# Patient Record
Sex: FEMALE | Race: WHITE | NOT HISPANIC OR LATINO
[De-identification: names, ages, dates, MRNs, and addresses within clinical notes are randomized per-mention and may not be internally consistent; named-entity substitution may affect disease eponyms.]

---

## 2019-04-07 ENCOUNTER — FORM ENCOUNTER (OUTPATIENT)
Age: 54
End: 2019-04-07

## 2019-04-08 ENCOUNTER — APPOINTMENT (OUTPATIENT)
Dept: ORTHOPEDIC SURGERY | Facility: CLINIC | Age: 54
End: 2019-04-08
Payer: MEDICAID

## 2019-04-08 ENCOUNTER — OUTPATIENT (OUTPATIENT)
Dept: OUTPATIENT SERVICES | Facility: HOSPITAL | Age: 54
LOS: 1 days | End: 2019-04-08
Payer: MEDICAID

## 2019-04-08 ENCOUNTER — APPOINTMENT (OUTPATIENT)
Dept: RADIOLOGY | Facility: CLINIC | Age: 54
End: 2019-04-08

## 2019-04-08 VITALS — HEIGHT: 61 IN | BODY MASS INDEX: 28.32 KG/M2 | WEIGHT: 150 LBS

## 2019-04-08 DIAGNOSIS — Z87.39 PERSONAL HISTORY OF OTHER DISEASES OF THE MUSCULOSKELETAL SYSTEM AND CONNECTIVE TISSUE: ICD-10-CM

## 2019-04-08 DIAGNOSIS — Z82.61 FAMILY HISTORY OF ARTHRITIS: ICD-10-CM

## 2019-04-08 PROCEDURE — 99204 OFFICE O/P NEW MOD 45 MIN: CPT

## 2019-04-08 PROCEDURE — 73564 X-RAY EXAM KNEE 4 OR MORE: CPT | Mod: 26,50

## 2019-04-08 PROCEDURE — 73564 X-RAY EXAM KNEE 4 OR MORE: CPT

## 2019-05-09 ENCOUNTER — APPOINTMENT (OUTPATIENT)
Dept: ORTHOPEDIC SURGERY | Facility: CLINIC | Age: 54
End: 2019-05-09
Payer: MEDICAID

## 2019-05-09 VITALS
WEIGHT: 145 LBS | HEIGHT: 61 IN | BODY MASS INDEX: 27.38 KG/M2 | DIASTOLIC BLOOD PRESSURE: 80 MMHG | SYSTOLIC BLOOD PRESSURE: 120 MMHG

## 2019-05-09 DIAGNOSIS — Z87.39 PERSONAL HISTORY OF OTHER DISEASES OF THE MUSCULOSKELETAL SYSTEM AND CONNECTIVE TISSUE: ICD-10-CM

## 2019-05-09 PROCEDURE — 99214 OFFICE O/P EST MOD 30 MIN: CPT

## 2019-05-09 RX ORDER — MELOXICAM 15 MG/1
15 TABLET ORAL
Refills: 0 | Status: COMPLETED | COMMUNITY
End: 2019-05-09

## 2019-05-09 NOTE — HISTORY OF PRESENT ILLNESS
[de-identified] : 54 year old female with osteoporosis and epilepsy presents today for initial evaluation of right knee pain that began a year ago and has been getting progressively worse. She reports right knee pain with walking and stairs as well as severe stiffness and instability on all surfaces. Patient can walk less than 5 blocks with a moderate limp and can ascend and descend stairs with a rail.  Physical therapy, bracing, ibuprofen and indomethacin help a little to relieve pain. She had a cortisone injection to the right knee which she reports did not help at all. She has consulted with Dr. Corbett who requested insurance authorization for HA injections but patient reports that she has not heard anything from his office.

## 2019-05-09 NOTE — DISCUSSION/SUMMARY
[de-identified] : 55 y/o F with right knee pain and mild arthritis on X-ray. We discussed the diagnosis and prognosis of the patient's condition. Non surgical treatment options include physical therapy and low impact exercise, weight loss for those who are overweight, NSAID and analgesic use, joint injections and activity modification including the use of assistive devices. I advised her to stay lean, limber, strong and coordinated. When the arthritis becomes severe, she may consider TKR surgery. I wrote a prescription for Meloxicam. Continue with physical therapy. My staff will look into whether or not her insurance has been contacted to authorize HA injections. \par Follow up by calling in a few weeks about HA injections if she has heard anything back yet.\par She can follow up with my Dr Corbett or with my PA Louann for further conservative management, as needed.

## 2019-05-09 NOTE — ADDENDUM
[FreeTextEntry1] : This note was written by Altagracia Andrade on 05/09/2019 acting as scribe for Dr. Duarte and KISHAN John.

## 2019-05-09 NOTE — PHYSICAL EXAM
[de-identified] : Constitutional: Well appearing. No acute distress.\par Mental Status: Alert & oriented to person, place and time. Normal affect.\par Pulmonary: No respiratory distress. Normal chest excursion.\par \par Gait: Normal.\par Ambulatory assist devices: Tape over right knee.\par \par Cervical spine: Skin intact. No visible deformity. Painless active ROM without evident restriction.\par Bilateral upper extremities: Skin intact. No deformity. Painless active ROM without evident restriction.\par Thoracolumbar spine: No deformity. No tenderness. No radicular pain on passive straight leg raise bilaterally.\par \par Pelvis: No pelvic obliquity. No tenderness.\par Leg lengths: Equal.\par Bilateral Hips: No swelling or deformity. No focal tenderness. Painless and unrestricted range of motion. No crepitation. \par \par Right Knee:\par Skin intact.\par No surgical scars.\par No erythema or ecchymosis.\par (+) slight effusion.\par No deformity.\par Medial and lateral joint line tenderness.\par Pain on patellar grind.\par Painless ROM from full extension to 135 degrees of flexion.\par Central patellar tracking.\par No crepitation.\par Good stability in all planes.\par \par Left Knee:\par Skin intact.\par No surgical scars.\par No erythema or ecchymosis.\par No swelling or effusion.\par No deformity.\par No focal tenderness.\par Painless and unrestricted range of motion. \par Central patellar tracking.\par No crepitation.\par No instability.\par \par Neurological: Intact distal crude touch sensation. Normal distal motor power. \par Cardiovascular: Palpable dorsalis pedis and posterior tibialis pulses. Brisk capillary refill. No peripheral edema.\par Lymphatics: No peripheral adenopathy appreciated. [de-identified] : X-ray imaging of the bilateral knees from April 2019 demonstrates very mild arthritis bilaterally.

## 2019-06-04 ENCOUNTER — APPOINTMENT (OUTPATIENT)
Dept: ORTHOPEDIC SURGERY | Facility: CLINIC | Age: 54
End: 2019-06-04
Payer: MEDICAID

## 2019-06-04 PROCEDURE — 20610 DRAIN/INJ JOINT/BURSA W/O US: CPT | Mod: RT

## 2019-06-04 RX ADMIN — Medication 1 MG/2ML: at 00:00

## 2019-06-04 NOTE — PROCEDURE
[Injection] : Injection [Bilateral] : of bilateral [Knee Joint] : knee joint [Osteoarthritis] : Osteoarthritis [Joint Pain] : Joint pain [Patient] : patient [Risk] : risk [Benefits] : benefits [Bleeding] : bleeding [Infection] : infection [Alternatives] : alternatives [Allergic Reaction] : allergic reaction [Verbal Consent Obtained] : verbal consent was obtained prior to the procedure [Alcohol] : Alcohol [Betadine] : Betadine [Ethyl Chloride Spray] : ethyl chloride spray was used as a topical anesthetic [Lateral] : lateral [20] : a 20-gauge [1.5  inch] : 1.5 inch needle [2 mL Hyalgan___(lot #)] : 2mL Hyalgan ~Ulot# [unfilled] [Bandage Applied] : a bandage [Tolerated Well] : The patient tolerated the procedure well [None] : none [No Strenuous Activity___day(s)] : avoid strenuous activity for [unfilled] day(s) [___ Week(s)] : in [unfilled] week(s) [de-identified] : Hyalgan # 1 of 3\par Bilateral knees\par Lot.No: [id number]\par Exp.Date: 2021-08-26\par Man: Andrew

## 2019-06-04 NOTE — ADDENDUM
[FreeTextEntry1] : Dr. Duarte was physically present during the key portions the encounter, provided assistance as needed, and formulated the assessment and plan as documented above.\par \par

## 2019-06-11 ENCOUNTER — APPOINTMENT (OUTPATIENT)
Dept: ORTHOPEDIC SURGERY | Facility: CLINIC | Age: 54
End: 2019-06-11
Payer: MEDICAID

## 2019-06-11 PROCEDURE — 20610 DRAIN/INJ JOINT/BURSA W/O US: CPT | Mod: RT

## 2019-06-11 RX ADMIN — Medication 1 MG/2ML: at 00:00

## 2019-06-11 NOTE — PROCEDURE
[de-identified] : Hyalgan # 2 of 3\par Bilateral Knees\par Lot.No: [id number]\par Exp.Date: 2021-08-26\par \par Procedure: Injection of bilateral knee joint. \par Indication:  Osteoarthritis and Joint pain. \par Risk, benefits and alternatives were discussed with the patient. Potential complications include bleeding, infection and allergic reaction. Verbal consent was obtained prior to the procedure. \par Alcohol and Betadine was used to prep the area. ethyl chloride spray was used as a topical anesthetic. Using sterile technique, the aspiration/injection needle was then directed from a lateral aspect. A 20-gauge and 1.5 inch needle was used to inject 2mL Hyalgan lot# [id number]. A bandage was applied. The patient tolerated the procedure well. \par Complications: none. \par Patient instructed to avoid strenuous activity for 2 day(s). \par Follow-up in the office in 1 week(s). \par

## 2019-06-18 ENCOUNTER — APPOINTMENT (OUTPATIENT)
Dept: ORTHOPEDIC SURGERY | Facility: CLINIC | Age: 54
End: 2019-06-18
Payer: MEDICAID

## 2019-06-18 PROCEDURE — 20610 DRAIN/INJ JOINT/BURSA W/O US: CPT | Mod: LT

## 2019-06-18 RX ADMIN — Medication 1 MG/2ML: at 00:00

## 2019-06-18 NOTE — PROCEDURE
[Injection] : Injection [Bilateral] : of bilateral [Knee Joint] : knee joint [Osteoarthritis] : Osteoarthritis [Joint Pain] : Joint pain [Patient] : patient [Risk] : risk [Benefits] : benefits [Alternatives] : alternatives [Bleeding] : bleeding [Infection] : infection [Allergic Reaction] : allergic reaction [Verbal Consent Obtained] : verbal consent was obtained prior to the procedure [Alcohol] : Alcohol [Betadine] : Betadine [Ethyl Chloride Spray] : ethyl chloride spray was used as a topical anesthetic [Lateral] : lateral [20] : a 20-gauge [1.5  inch] : 1.5 inch needle [Tolerated Well] : The patient tolerated the procedure well [2 mL Hyalgan___(lot #)] : 2mL Hyalgan ~Ulot# [unfilled] [None] : none [No Strenuous Activity___day(s)] : avoid strenuous activity for [unfilled] day(s) [PRN] : as needed [de-identified] : Hyalgan # 3 of 3\par Bilateral Knees\par Lot.No: [id number]\par Exp.Date: 2021-08-26\par

## 2019-09-12 ENCOUNTER — APPOINTMENT (OUTPATIENT)
Dept: OTOLARYNGOLOGY | Facility: CLINIC | Age: 54
End: 2019-09-12
Payer: MEDICAID

## 2019-09-12 VITALS
BODY MASS INDEX: 27.38 KG/M2 | HEART RATE: 60 BPM | DIASTOLIC BLOOD PRESSURE: 81 MMHG | HEIGHT: 61 IN | WEIGHT: 145 LBS | SYSTOLIC BLOOD PRESSURE: 135 MMHG

## 2019-09-12 DIAGNOSIS — R09.89 OTHER SPECIFIED SYMPTOMS AND SIGNS INVOLVING THE CIRCULATORY AND RESPIRATORY SYSTEMS: ICD-10-CM

## 2019-09-12 DIAGNOSIS — R49.0 DYSPHONIA: ICD-10-CM

## 2019-09-12 DIAGNOSIS — K21.9 GASTRO-ESOPHAGEAL REFLUX DISEASE W/OUT ESOPHAGITIS: ICD-10-CM

## 2019-09-12 DIAGNOSIS — R13.12 DYSPHAGIA, OROPHARYNGEAL PHASE: ICD-10-CM

## 2019-09-12 PROCEDURE — 31575 DIAGNOSTIC LARYNGOSCOPY: CPT

## 2019-09-12 PROCEDURE — 99203 OFFICE O/P NEW LOW 30 MIN: CPT | Mod: 25

## 2019-09-12 RX ORDER — LEVETIRACETAM 500 MG/1
500 TABLET, FILM COATED, EXTENDED RELEASE ORAL
Qty: 120 | Refills: 0 | Status: ACTIVE | COMMUNITY
Start: 2018-07-09

## 2019-09-12 RX ORDER — ERGOCALCIFEROL 1.25 MG/1
1.25 MG CAPSULE, LIQUID FILLED ORAL
Qty: 4 | Refills: 0 | Status: ACTIVE | COMMUNITY
Start: 2019-05-09

## 2019-09-12 NOTE — CONSULT LETTER
[FreeTextEntry1] : Dear Dr. TEODORO CAR \par I had the pleasure of evaluating your patient YOSSI ELY  thank you for allowing us to participate in their care. please see full note detailing our visit below.\par If you have any questions, please do not hesitate to call me and I would be happy to discuss further. \par \par Ziyad Gutierrez M.D.\par Attending Physician,  \par Department of Otolaryngology - Head and Neck Surgery\par [facility] \par Office: [phone]\par Fax: [phone]\par

## 2019-09-12 NOTE — ASSESSMENT
[FreeTextEntry1] : Pt with globus, difficulty swallowing and throat clearing with severe laryngeal reflux on exam. This is the most probable cause at this point. 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 Handout detailing regiment also given\par - Continue Omeprazole 20mg BID\par - Zantac QHS\par if persists will follow with my partner Dr. Trujillo for possible botox injection\par \par \par I personally saw and examined YOSSI ELY in detail. I spoke to KISHAN Quiñonez regarding the assessment and plan of care.  I preformed the procedures and I reviewed the above assessment and plan of care, and agree. I have made changes in changes in the body of the note where appropriate.\par \par \par

## 2019-09-12 NOTE — PROCEDURE
[de-identified] : Procedure performed: laryngeal Endoscopy- Diagnostic\par Pre-op/post op indication: globus\par Verbal and/or written consent obtained from patient\par Scope #: Olympus flexible fiber optic telescope used.\par Scope was introduced through the nose passed on the floor of the nose to the nasopharynx and then followed down the soft palate to the lower pharynx. The tongue Base, Larynx, Hypopharynx were examined. Base of tongue was symmetric, vallecular was clear, epiglottis was not deformed, Glottis with fully mobile vocal cords without lesions or masses. Visualized subglottis clear. Postcricoid area with erythema and edema.  Pos intra-arytenoid bar. +severe reflux changes. No pooling of secretions, masses or lesions. Airway patent, no foreign body visualized. No glottic/supraglottic edema. True vocal cords, arytenoids, vestibular folds, ventricles, pyriform sinuses, and aryepiglottic folds appear normal bilaterally. Vocal cords mobile with good contact b/l.\par

## 2019-09-12 NOTE — HISTORY OF PRESENT ILLNESS
[FreeTextEntry1] : \par \par \par \par \par \par  [de-identified] : 53 y/o female with around 1 year history of worsening throat tightness and difficulty swallowing solids initially and now with issues swallowing saliva. Had swallow study 3 weeks ago which was abnormal.\par Also worked up by GI for reflux- started on Omeprazole 20mg BID which she's been taking for the past month. \par + Hoarseness x 2-3 weeks\par + SOB- feels gets out of breath when talking\par + Feels neck and throat is swollen\par No throat pain.

## 2019-11-14 ENCOUNTER — APPOINTMENT (OUTPATIENT)
Dept: OTOLARYNGOLOGY | Facility: CLINIC | Age: 54
End: 2019-11-14
Payer: MEDICAID

## 2019-11-14 VITALS
SYSTOLIC BLOOD PRESSURE: 130 MMHG | DIASTOLIC BLOOD PRESSURE: 77 MMHG | BODY MASS INDEX: 27.38 KG/M2 | WEIGHT: 145 LBS | HEIGHT: 61 IN | HEART RATE: 77 BPM

## 2019-11-14 PROCEDURE — 31575 DIAGNOSTIC LARYNGOSCOPY: CPT

## 2019-11-14 PROCEDURE — 99214 OFFICE O/P EST MOD 30 MIN: CPT | Mod: 25

## 2019-11-14 NOTE — CONSULT LETTER
[Dear  ___] : Dear  [unfilled], [Consult Letter:] : I had the pleasure of evaluating your patient, [unfilled]. [Please see my note below.] : Please see my note below. [Consult Closing:] : Thank you very much for allowing me to participate in the care of this patient.  If you have any questions, please do not hesitate to contact me. [Sincerely,] : Sincerely, [FreeTextEntry2] : DR Ziyad Gutierrez [FreeTextEntry3] : \par Tyrese Byrne MD, FACS\par \par Otolaryngology-Head and Neck Surgery\par [employer] [facility]\par

## 2019-11-14 NOTE — HISTORY OF PRESENT ILLNESS
[de-identified] : 53 y/o female refer by Dr. rodriguez for globus sensation. pt has hx of reflux and being workup by GI and told reflux. pt still c.o globus and throat swelling and neck swelling for one year. \par pt denies wt loss, fever, coughing. \par pt has ct of chest and abd done at [facility] 8/2019, no ct neck done

## 2019-11-21 ENCOUNTER — APPOINTMENT (OUTPATIENT)
Dept: OTOLARYNGOLOGY | Facility: CLINIC | Age: 54
End: 2019-11-21

## 2019-12-03 ENCOUNTER — FORM ENCOUNTER (OUTPATIENT)
Age: 54
End: 2019-12-03

## 2019-12-04 ENCOUNTER — OUTPATIENT (OUTPATIENT)
Dept: OUTPATIENT SERVICES | Facility: HOSPITAL | Age: 54
LOS: 1 days | End: 2019-12-04
Payer: MEDICAID

## 2019-12-04 ENCOUNTER — APPOINTMENT (OUTPATIENT)
Dept: RADIOLOGY | Facility: HOSPITAL | Age: 54
End: 2019-12-04

## 2019-12-04 ENCOUNTER — APPOINTMENT (OUTPATIENT)
Dept: SPEECH THERAPY | Facility: HOSPITAL | Age: 54
End: 2019-12-04

## 2019-12-04 ENCOUNTER — OUTPATIENT (OUTPATIENT)
Dept: OUTPATIENT SERVICES | Facility: HOSPITAL | Age: 54
LOS: 1 days | Discharge: HOME | End: 2019-12-04

## 2019-12-04 DIAGNOSIS — K21.9 GASTRO-ESOPHAGEAL REFLUX DISEASE WITHOUT ESOPHAGITIS: ICD-10-CM

## 2019-12-04 DIAGNOSIS — R13.10 DYSPHAGIA, UNSPECIFIED: ICD-10-CM

## 2019-12-04 PROCEDURE — 74230 X-RAY XM SWLNG FUNCJ C+: CPT | Mod: 26

## 2019-12-04 NOTE — HISTORY OF PRESENT ILLNESS
[FreeTextEntry1] : Patient arrived to Radiology for an Outpatient Modified Barium Swallow Study. Patient is a 53 y/o female with PMH as per EMR: \par \par Active Problems\par Dysphagia, oropharyngeal (787.22) (R13.12)\par Dysphonia (784.42) (R49.0)\par Globus pharyngeus (306.4) (R09.89)\par LPRD (laryngopharyngeal reflux disease) (478.79) (K21.9)\par Osteoarthritis of knees, bilateral (715.96) (M17.0)\par Right knee pain (719.46) (M25.561)\par \par Past Medical History\par History of arthritis (V13.4) (Z87.39)\par History of osteoporosis (V13.59) (Z87.39)\par History of Seizures (780.39) (R56.9)\par \par Surgical History\par History of Cholecystectomy\par History of Hand surgery\par History of Knee Surgery\par  \par ENT Note on 11/14/2019 \par - Globus sensation associated with LPR symptoms.\par - Discussed reflux diet and lifestyle changes with the patient today.\par - Written instruction given to the patient.\par - Will review MBS and scans.\par - Return in 4 weeks.\par \par Patient offers c/o difficulty swallowing "feels like it get stuck in my throat" x 2 1/2 years with increasing frequency. Patient reports needing liquid to wash down the food.  Patient reports having difficulty with certain types of food items such as bread, meat, even cake. Patient reports tolerating soft consistency (e.g. salmon, mash potato) with Thin Liquids. Patient states that she has been more attentive to avoid food  items that may increase her reflux symptoms (Reflux Diet). Patient also reports that her "right sided swelling has gone down".  No current/repeated pneumonia and no unintentional weight loss.  Patient reports that she had an Esophagram completed at an outside facility back on August 2019 was told "it narrows down."   This Modified Barium Swallow Study is to objectively assess the Physiology of the Oral and Pharyngeal stage swallowing mechanism for treatment plan for least restrictive diet. \par \par Referring MD: Dr. Tyrese Byrne\par

## 2019-12-04 NOTE — ASSESSMENT
[FreeTextEntry1] : Patient presents with Functional Oral and Pharyngeal stage swallowing mechanism.  The Oral Stage is characterized by adequate oral containment, adequate chewing for solid, adequate bolus manipulation, adequate tongue motion with adequate anterior to posterior transfer of the bolus; intermittent piecemeal transfer and deglutition with trace oral clearance deficits located on the tongue surface post primary swallow; with adequate oral clearance post spontaneous secondary swallow.    The Pharyngeal Stage is characterized by adequate initiation of the pharyngeal swallow, adequate laryngeal elevation, adequate tongue base retraction and adequate pharyngeal constriction. There was intermittent trace vallecular residue post primary swallow for puree/solids.  There was No Aspiration observed before, during or after the swallow for puree/solids/thin liquids.\par \par Of Note: An Esophageal Screen was performed. Patient was given a Barium Tablet with a cup of water. The Tablet was observed to course through the pharynx/esophagus without hold up. This cannot be considered as a full complete evaluation of the esophagus. \par \par

## 2019-12-04 NOTE — PLAN
[FreeTextEntry2] : \par 1.) Regular Consistency with Thin liquids\par 2.) Feeding/Swallowing Guidelines: Sit upright, small bites, chew solids well, single cup sips; two swallows per bite/sip.\par 3.) Aspiration Precautions\par 4.) Reflux Precautions and Management\par 5.) Maintain Good Oral Hygiene Care\par 6.) Follow up with referring Physician\par 7.) Follow up with GI Physician given patient reports having sensation of food passing slowly down the esophagus pointing to the mid-chest area post swallow. \par \par SLP provided Patient education regarding preliminary results. Patient verbalized understanding and will follow up with Physicians for management. \par

## 2019-12-06 DIAGNOSIS — R13.10 DYSPHAGIA, UNSPECIFIED: ICD-10-CM

## 2019-12-19 ENCOUNTER — APPOINTMENT (OUTPATIENT)
Dept: OTOLARYNGOLOGY | Facility: CLINIC | Age: 54
End: 2019-12-19
Payer: MEDICAID

## 2019-12-19 VITALS
DIASTOLIC BLOOD PRESSURE: 87 MMHG | HEART RATE: 71 BPM | SYSTOLIC BLOOD PRESSURE: 133 MMHG | HEIGHT: 61 IN | WEIGHT: 145 LBS | BODY MASS INDEX: 27.38 KG/M2

## 2019-12-19 PROCEDURE — 99213 OFFICE O/P EST LOW 20 MIN: CPT

## 2019-12-19 RX ORDER — RANITIDINE 150 MG/1
150 TABLET ORAL
Qty: 90 | Refills: 3 | Status: COMPLETED | COMMUNITY
Start: 2019-09-12 | End: 2019-12-19

## 2019-12-19 RX ORDER — ALENDRONATE SODIUM 70 MG/1
70 TABLET ORAL
Qty: 4 | Refills: 0 | Status: COMPLETED | COMMUNITY
Start: 2018-12-04 | End: 2019-12-19

## 2019-12-19 RX ORDER — MUPIROCIN 20 MG/G
2 OINTMENT TOPICAL
Qty: 22 | Refills: 0 | Status: COMPLETED | COMMUNITY
Start: 2019-08-12 | End: 2019-12-19

## 2019-12-19 RX ORDER — MELOXICAM 15 MG/1
15 TABLET ORAL
Qty: 30 | Refills: 1 | Status: COMPLETED | COMMUNITY
Start: 2019-05-09 | End: 2019-12-19

## 2019-12-19 RX ORDER — OMEPRAZOLE 20 MG/1
20 CAPSULE, DELAYED RELEASE ORAL
Qty: 60 | Refills: 0 | Status: COMPLETED | COMMUNITY
Start: 2019-08-21 | End: 2019-12-19

## 2019-12-19 RX ORDER — INDOMETHACIN 50 MG/1
CAPSULE ORAL
Refills: 0 | Status: COMPLETED | COMMUNITY
End: 2019-12-19

## 2019-12-20 NOTE — HISTORY OF PRESENT ILLNESS
[de-identified] : 55 y/o female refer by Dr. Gutierrez for globus sensation. pt has hx of reflux and being workup by GI and told reflux. pt still c.o globus and throat swelling and neck swelling for one year. \par pt denies wt loss, fever, coughing. \par pt has ct of chest and abd done at [facility] 8/2019, no ct neck done \par symptoms much better after she started reflux diet.\par Complete review of systems which was performed during a previous encounter was reviewed with the patient and there are no changes except as stated in the HPI section.\par

## 2020-10-08 ENCOUNTER — APPOINTMENT (OUTPATIENT)
Dept: ORTHOPEDIC SURGERY | Facility: CLINIC | Age: 55
End: 2020-10-08
Payer: MEDICAID

## 2020-10-08 VITALS
WEIGHT: 147 LBS | BODY MASS INDEX: 27.75 KG/M2 | DIASTOLIC BLOOD PRESSURE: 84 MMHG | HEART RATE: 74 BPM | SYSTOLIC BLOOD PRESSURE: 148 MMHG | TEMPERATURE: 98.1 F | HEIGHT: 61 IN

## 2020-10-08 DIAGNOSIS — Z78.9 OTHER SPECIFIED HEALTH STATUS: ICD-10-CM

## 2020-10-08 PROCEDURE — 72170 X-RAY EXAM OF PELVIS: CPT

## 2020-10-08 PROCEDURE — 73562 X-RAY EXAM OF KNEE 3: CPT | Mod: RT

## 2020-10-08 PROCEDURE — 99215 OFFICE O/P EST HI 40 MIN: CPT

## 2020-10-08 NOTE — HISTORY OF PRESENT ILLNESS
[de-identified] : Ms. YOSSI ELY is a 55 year old female  presenting with right knee pain, worsening.  She localizes the pain to the medial anterior aspect of the right knee. She  describes the pain as constant, and states the pain is present when standing, sitting, walking and laying. She admits to occasional swelling, clicking and grinding of the knee. She has occasional buckling as well. \par She has been treated conservatively with physical therapy, Steroid injection in June of 2020, with no relief. she admits to limitations of quality of life, and is here to discuss options for treatment.\par  [Worsening] : worsening [9] : a current pain level of 9/10 [Constant] : ~He/She~ states the symptoms seem to be constant [Walking] : worsened by walking [Rest] : relieved by rest

## 2020-10-08 NOTE — PHYSICAL EXAM
[LE] : Sensory: Intact in bilateral lower extremities [Knee] : patellar 2+ and symmetric bilaterally [Ankle] : ankle 2+ and symmetric bilaterally [DP] : dorsalis pedis 2+ and symmetric bilaterally [PT] : posterior tibial 2+ and symmetric bilaterally [de-identified] : On general examination the patient is adequately groomed and nourished. The vital parameters are as recorded. \par There is no lymphedema or diffuse swelling, no varicose veins, no skin warmth/erythema/scars/swelling, no ulcers and no palpable lymph nodes or masses in both lower extremities. Bilateral pedal pulses are well palpable.\par Upper Extremity:\par Both right and left upper extremities are unremarkable in terms of skin rash, lesions, pigmentation, redness, tenderness, swelling, joint instability, abnormal deformity or crepitus. The overall range of motion, sensation, motor tone and strength testing are normal.\par \par Bilateral Knee Exam: \par The gait is bilateral stiff knee antalgic.\par Knee alignment:          bilateral varus, right more than left. \par Both knees demonstrate no scars and the skin has no warmth, erythema, swelling or tenderness. \par Both knees have a range of motion of\par Extension:                    Right 0 degrees     Left 0 degrees\par Flexion:                                   Right 110 degrees      Left 125 degrees\par There is right knee medial joint line tenderness. There is right knee mild effusion. \par Fouzia's test is positive. Erica test is positive.\par Lachman's test, Anterior/Posterior Drawer test and Pivot Shift Tests are negative. \par There is right knee grade 1 MCL mediolateral laxity and no anteroposterior instability. \par Patella compression test is positive and patellofemoral tracking is normal with no lateral subluxation, apprehension or instability. \par Right knee quadriceps and hamstrings power is 4+.\par Left knee quadriceps and hamstrings power is 4+.\par \par Hip Exam:\par The gait and station is normal\par The patient has equal leg lengths and no pelvic tilt. Siddhartha/Nieves test is 7 inches on the right and 7 inches on the left. Active SLR is 60 degrees on the right and 60 degrees on the left. Both hips demonstrate no scars and the skin has no signs of inflammation or tenderness. \par Both Hips have a normal range of motion of flexion to 100 degrees, abduction 40 degrees, adduction 20 degrees, external rotation 40 degrees, internal rotation 20 degrees with symmetrical motion in flexion and extension. There is no flexion contracture, deformity or instability. Labral impingement tests are negative.\par Both hips flexor, abductor and extensor power is normal.\par  [de-identified] : The following radiographs were ordered and read by me during this patients visit. I reviewed each radiograph in detail with the patient and discussed the findings as highlighted below. \par AP, lateral and skyline views of the right knee confirm advanced degenerative joint disease with medial joint space narrowing and osteophyte formation, along with subchondral sclerosis. \par AP view of the pelvis are within normal limits\par

## 2020-10-08 NOTE — CONSULT LETTER
[Dear  ___] : Dear  [unfilled], [Consult Letter:] : I had the pleasure of evaluating your patient, [unfilled]. [Please see my note below.] : Please see my note below. [Consult Closing:] : Thank you very much for allowing me to participate in the care of this patient.  If you have any questions, please do not hesitate to contact me. [Sincerely,] : Sincerely, [DrFranklin  ___] : Dr. ARGUELLO [FreeTextEntry2] : Dr. Vicenta Oneal\par TEODORO CAR\par  [FreeTextEntry3] : Que Gonzalez MD\par \par ______________________________________________\par [facility]: Hip/Knee Arthroplasty\par [address], [address], [place] [address]\par (t) [phone]\par (f) [phone]\par

## 2020-10-08 NOTE — DISCUSSION/SUMMARY
[de-identified] : Right knee advanced degenerative joint disease \par \par The natural history and treatment of degenerative arthritis was discussed with the patient at length today. The spectrum of treatment including nonoperative modalities to surgical intervention was elucidated. Noninvasive and nonoperative treatment modalities include weight reduction, activity modification with low impact exercise,  as needed use of acetaminophen or anti-inflammatory medications if tolerated, glucosamine/chondroitin supplements, and physical therapy. Further treatments can include corticosteroid injection and the use of viscosupplementation with hyaluronic acid injections. Definitive surgical treatment can certainly include total joint arthroplasty also. The risks and benefits of each treatment options was discussed and all questions were answered.\par \par In view of lack of adequate pain relief with conservative (non-surgical) management protocol including physical therapy, home exercises, weight loss, activity modification, NSAIDS; the patient is recommended to consider a RIGHT Total Knee Replacement. \par \par The risks, benefits, alternatives, implications, complications including but not limited to pain, stiffness, bleeding, limp, wound breakdown, infection, bone fracture, nerve and vascular compromise, implant wear, fixation options depending on bone quality, instability, and durability issues and rehabilitation were discussed and relevant questions were addressed. The possibility of recurrent pain, no improvement in pain and actual worsening of the pain were also mentioned in conversation with the patient. Medical complications related to the patient's general medical health including deep vein thrombosis, pulmonary embolus, heart attack, stroke, death and other complications from anesthesia were discussed as well.  Anticoagulation prophylaxis medication options to address risks of deep vein thrombosis and pulmonary embolism were discussed and weighed against the risks of bleeding and wound healing complications. The patient elected Ecotrin/Xarelto prophylaxis with mechanical modalities.\par \par  I have reviewed the plan of care as well as a model of a total knee replacement implant equivalent to the one that will be used for their total knee replacement.  The patient agrees with the plan of care, as well as the use of implants for their total knee replacement.  The patient wishes to proceed and will undergo preoperative medical evaluation and clearance, attend the preoperative educational class and will schedule surgery appropriately.\par \par Patient has been indicated for a KEVIN Robotic Assisted total knee replacement. A prescription for a CT scan with KEVIN Protocol was provided for the right knee.\par \par The patient was advised that a COVID PCR test would be required within 72 hours prior to surgery.

## 2020-11-24 ENCOUNTER — APPOINTMENT (OUTPATIENT)
Dept: ORTHOPEDIC SURGERY | Facility: CLINIC | Age: 55
End: 2020-11-24
Payer: MEDICAID

## 2020-11-24 VITALS — TEMPERATURE: 96.6 F

## 2020-11-24 DIAGNOSIS — Z78.9 OTHER SPECIFIED HEALTH STATUS: ICD-10-CM

## 2020-11-24 DIAGNOSIS — Z72.3 LACK OF PHYSICAL EXERCISE: ICD-10-CM

## 2020-11-24 DIAGNOSIS — Z82.61 FAMILY HISTORY OF ARTHRITIS: ICD-10-CM

## 2020-11-24 DIAGNOSIS — Z82.62 FAMILY HISTORY OF OSTEOPOROSIS: ICD-10-CM

## 2020-11-24 PROCEDURE — 99214 OFFICE O/P EST MOD 30 MIN: CPT

## 2020-11-24 NOTE — DISCUSSION/SUMMARY
[de-identified] : This patient has severe right knee osteoarthritis.  She has failed a course of conservative management and would like to proceed with a right total knee arthroplasty using computer navigation for assistance.\par \par The patient is an appropriate candidate for consideration of right total knee replacement. An extensive discussion was conducted of the natural history of the disease and the variety of surgical and non-surgical treatment options available to the patient. A risk/benefit analysis was discussed with the patient reviewing the advantages and disadvantages of surgical intervention at this time. Both the level and length of the patient's pain have made additional conservative treatment measures consisting of NSAIDs, physical therapy, corticosteroids, and/or viscosupplementation contraindicated. A full explanation was given of the nature and the purpose of the procedure and anesthesia, its benefits, possible alternative methods of diagnosis or treatment, the risks involved, the possibility of complications, the foreseeable consequences of the procedure and the possible results of the non-treatment. I reviewed the plan of care as well as used a model of a total knee implant equivalent to the one that will be used for their total knee joint replacement. The ability to secure the implant utilizing cement or cementless (press-fit) was discussed with the patient. The patient agrees with the plan of care, as well as the use of implants for their total knee replacement.   We also discussed that if robotic/computer navigation is utilized, then additional incisions may need to be made to accommodate the computer navigation arrays, which will be placed in the femur and tibia.\par \par No guarantee or assurance was made as to the results that may be obtained. Specifically, the risks were identified to include, but are not limited to the following: Infection, phlebitis, pulmonary embolism, death, paralysis, dislocation, pain, stiffness, instability, limp, weakness, breakage, leg-length inequality, uncontrolled bleeding, nerve injury, blood vessel injury, pressure sores, anesthetic risks, delayed healing of wound and bone, and wear and loosening. Further discussion was undertaken with the patient about the details of surgical preparation, treatment, and postoperative rehabilitation including medical clearance, autotransfusion, the hospital course, and the postoperative rehabilitation involved. All in all, I feel that this patient is a good candidate for surgical reconstruction.\par \par The patient and I discussed the current SARS-CoV-2 (COVID-19) pandemic which has affected our local hospitals. We discussed that our hospitals treat patients with COVID-19. All efforts will be made to avoid cohorting the patient with diagnosed or suspected COVID-19 patient. They also understand that we will screen them 24-48 hours prior to surgery. Despite our best efforts, there is a potential risk for iatrogenic transmission of COVID-19 to the patient during the perioperative period. [place] COVID-19 during the perioperative period may increase the patient´s risks of an adverse outcome including postoperative pneumonia, difficulty breathing, requirement for a breathing tube (general endotracheal intubation), and death. The patient is understanding of this risk, and is willing to proceed with surgery at this time.

## 2020-11-24 NOTE — HISTORY OF PRESENT ILLNESS
[de-identified] : This is very nice 55-year-old female experiencing right knee pain, which is severe in intensity.  Pain is been present for more than 6 months.  The pain substantially limits activities of daily living. Walking tolerance is reduced. Medication including NSAIDs and activity modification have been minimally effective for a period lasting greater than three months in duration.  The patient is already tried a course of intra-articular cortisone injections which have not helped.  Assistive devices and external support were not deemed by the patient to be helpful in improving their function. Due to the severity of osteoarthritis and level of pain, physical therapy is contraindicated. Pain and restriction of function are intolerable at this time. The patient denies any radiation of the pain to the feet and it is not associated with numbness, tingling, or weakness.

## 2020-11-24 NOTE — PHYSICAL EXAM
[de-identified] : Patient is well nourished, well-developed, in no acute distress, with appropriate mood and affect. The patient is oriented to time, place, and person. Respirations are even and unlabored. Gait evaluation does reveal a limp. There is no inguinal adenopathy. Examination of the contralateral knee shows normal range of motion, strength, no tenderness, and intact skin. The affected limb is well-perfused, without skin lesions, shows a grossly normal motor and sensory examination. Knee motion is significantly reduced and does cause significant pain. The knee moves from 0 to 125 degrees. The knee is stable within that range-of-motion to AP and ML stress. The alignment of the knee is 5 degrees varus. Muscle strength is normal. Pedal pulses are palpable. Hip examination was negative.\par  [de-identified] : Long standing knee, AP knee, lateral knee, and patellar views of the right knee were brought in by the patient which I reviewed and demonstrate degenerative joint disease of the knee with joint space narrowing, osteophyte formation, and subchondral sclerosis.

## 2020-12-14 ENCOUNTER — OUTPATIENT (OUTPATIENT)
Dept: OUTPATIENT SERVICES | Facility: HOSPITAL | Age: 55
LOS: 1 days | End: 2020-12-14
Payer: MEDICAID

## 2020-12-14 VITALS
HEART RATE: 68 BPM | HEIGHT: 61 IN | WEIGHT: 149.91 LBS | SYSTOLIC BLOOD PRESSURE: 140 MMHG | DIASTOLIC BLOOD PRESSURE: 80 MMHG | RESPIRATION RATE: 16 BRPM | OXYGEN SATURATION: 98 % | TEMPERATURE: 98 F

## 2020-12-14 DIAGNOSIS — M19.90 UNSPECIFIED OSTEOARTHRITIS, UNSPECIFIED SITE: ICD-10-CM

## 2020-12-14 DIAGNOSIS — Z98.891 HISTORY OF UTERINE SCAR FROM PREVIOUS SURGERY: Chronic | ICD-10-CM

## 2020-12-14 DIAGNOSIS — M17.11 UNILATERAL PRIMARY OSTEOARTHRITIS, RIGHT KNEE: ICD-10-CM

## 2020-12-14 DIAGNOSIS — Z90.49 ACQUIRED ABSENCE OF OTHER SPECIFIED PARTS OF DIGESTIVE TRACT: Chronic | ICD-10-CM

## 2020-12-14 DIAGNOSIS — Z01.818 ENCOUNTER FOR OTHER PREPROCEDURAL EXAMINATION: ICD-10-CM

## 2020-12-14 DIAGNOSIS — Z98.890 OTHER SPECIFIED POSTPROCEDURAL STATES: Chronic | ICD-10-CM

## 2020-12-14 LAB
A1C WITH ESTIMATED AVERAGE GLUCOSE RESULT: 5.4 % — SIGNIFICANT CHANGE UP (ref 4–5.6)
ANION GAP SERPL CALC-SCNC: 11 MMOL/L — SIGNIFICANT CHANGE UP (ref 5–17)
BLD GP AB SCN SERPL QL: NEGATIVE — SIGNIFICANT CHANGE UP
BUN SERPL-MCNC: 9 MG/DL — SIGNIFICANT CHANGE UP (ref 7–23)
CALCIUM SERPL-MCNC: 9.6 MG/DL — SIGNIFICANT CHANGE UP (ref 8.4–10.5)
CHLORIDE SERPL-SCNC: 106 MMOL/L — SIGNIFICANT CHANGE UP (ref 96–108)
CO2 SERPL-SCNC: 25 MMOL/L — SIGNIFICANT CHANGE UP (ref 22–31)
CREAT SERPL-MCNC: 0.57 MG/DL — SIGNIFICANT CHANGE UP (ref 0.5–1.3)
ESTIMATED AVERAGE GLUCOSE: 108 MG/DL — SIGNIFICANT CHANGE UP (ref 68–114)
GLUCOSE SERPL-MCNC: 80 MG/DL — SIGNIFICANT CHANGE UP (ref 70–99)
HCT VFR BLD CALC: 42.9 % — SIGNIFICANT CHANGE UP (ref 34.5–45)
HGB BLD-MCNC: 14.1 G/DL — SIGNIFICANT CHANGE UP (ref 11.5–15.5)
MCHC RBC-ENTMCNC: 30.5 PG — SIGNIFICANT CHANGE UP (ref 27–34)
MCHC RBC-ENTMCNC: 32.9 GM/DL — SIGNIFICANT CHANGE UP (ref 32–36)
MCV RBC AUTO: 92.7 FL — SIGNIFICANT CHANGE UP (ref 80–100)
NRBC # BLD: 0 /100 WBCS — SIGNIFICANT CHANGE UP (ref 0–0)
PLATELET # BLD AUTO: 186 K/UL — SIGNIFICANT CHANGE UP (ref 150–400)
POTASSIUM SERPL-MCNC: 4.3 MMOL/L — SIGNIFICANT CHANGE UP (ref 3.5–5.3)
POTASSIUM SERPL-SCNC: 4.3 MMOL/L — SIGNIFICANT CHANGE UP (ref 3.5–5.3)
RBC # BLD: 4.63 M/UL — SIGNIFICANT CHANGE UP (ref 3.8–5.2)
RBC # FLD: 12.1 % — SIGNIFICANT CHANGE UP (ref 10.3–14.5)
RH IG SCN BLD-IMP: POSITIVE — SIGNIFICANT CHANGE UP
SODIUM SERPL-SCNC: 142 MMOL/L — SIGNIFICANT CHANGE UP (ref 135–145)
WBC # BLD: 7.28 K/UL — SIGNIFICANT CHANGE UP (ref 3.8–10.5)
WBC # FLD AUTO: 7.28 K/UL — SIGNIFICANT CHANGE UP (ref 3.8–10.5)

## 2020-12-14 PROCEDURE — 86901 BLOOD TYPING SEROLOGIC RH(D): CPT

## 2020-12-14 PROCEDURE — G0463: CPT

## 2020-12-14 PROCEDURE — 83036 HEMOGLOBIN GLYCOSYLATED A1C: CPT

## 2020-12-14 PROCEDURE — 87640 STAPH A DNA AMP PROBE: CPT

## 2020-12-14 PROCEDURE — 85027 COMPLETE CBC AUTOMATED: CPT

## 2020-12-14 PROCEDURE — 86900 BLOOD TYPING SEROLOGIC ABO: CPT

## 2020-12-14 PROCEDURE — 86850 RBC ANTIBODY SCREEN: CPT

## 2020-12-14 PROCEDURE — 87641 MR-STAPH DNA AMP PROBE: CPT

## 2020-12-14 PROCEDURE — 80048 BASIC METABOLIC PNL TOTAL CA: CPT

## 2020-12-14 RX ORDER — LIDOCAINE HCL 20 MG/ML
0.2 VIAL (ML) INJECTION ONCE
Refills: 0 | Status: DISCONTINUED | OUTPATIENT
Start: 2020-12-23 | End: 2021-01-06

## 2020-12-14 RX ORDER — LEVETIRACETAM 250 MG/1
1 TABLET, FILM COATED ORAL
Qty: 0 | Refills: 0 | DISCHARGE

## 2020-12-14 RX ORDER — GENTAMICIN SULFATE 40 MG/ML
80 VIAL (ML) INJECTION ONCE
Refills: 0 | Status: DISCONTINUED | OUTPATIENT
Start: 2020-12-23 | End: 2021-01-06

## 2020-12-14 RX ORDER — SODIUM CHLORIDE 9 MG/ML
3 INJECTION INTRAMUSCULAR; INTRAVENOUS; SUBCUTANEOUS EVERY 8 HOURS
Refills: 0 | Status: DISCONTINUED | OUTPATIENT
Start: 2020-12-23 | End: 2021-01-06

## 2020-12-14 NOTE — H&P PST ADULT - HISTORY OF PRESENT ILLNESS
56 y/o female with h/o seizure (last episode 2016) OA, c/o right knee pain he pain substantially limits activities of daily living. Walking tolerance is reduced, s/p cortisone injections (last 8/2020) and PT which have not helped. Today she present to PST for scheduled Right Total Knee Replacement on 12/23/20. Denies any palpitations, SOB, N/V, fever or chills.   ***COVID swab booking info provided pt will schedule visit 72 hr prior to surgery. Pt verbalized understanding.***

## 2020-12-14 NOTE — H&P PST ADULT - NSICDXPASTSURGICALHX_GEN_ALL_CORE_FT
PAST SURGICAL HISTORY:  H/O arthroscopy of knee Right      H/O arthroscopy of shoulder 2010 Right    H/O hand surgery 2017 Left hand    H/O:      S/P cholecystectomy > 15 years ago

## 2020-12-14 NOTE — H&P PST ADULT - NSICDXPROBLEM_GEN_ALL_CORE_FT
PROBLEM DIAGNOSES  Problem: OA (osteoarthritis)  Assessment and Plan: Right Total Knee Replacement on 12/23/20  Pre-op education provided - all questions answered. Pt verbalized understanding

## 2020-12-14 NOTE — H&P PST ADULT - NSICDXPASTMEDICALHX_GEN_ALL_CORE_FT
PAST MEDICAL HISTORY:  Globus pharyngeus h/o    H/O placenta previa     OA (osteoarthritis)     OP (osteoporosis)     Seizure last episode 2016 (missed dose)
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
## 2020-12-15 LAB
MRSA PCR RESULT.: SIGNIFICANT CHANGE UP
S AUREUS DNA NOSE QL NAA+PROBE: SIGNIFICANT CHANGE UP

## 2020-12-16 PROBLEM — R09.89 OTHER SPECIFIED SYMPTOMS AND SIGNS INVOLVING THE CIRCULATORY AND RESPIRATORY SYSTEMS: Chronic | Status: ACTIVE | Noted: 2020-12-14

## 2020-12-16 PROBLEM — M81.0 AGE-RELATED OSTEOPOROSIS WITHOUT CURRENT PATHOLOGICAL FRACTURE: Chronic | Status: ACTIVE | Noted: 2020-12-14

## 2020-12-16 PROBLEM — Z87.59 PERSONAL HISTORY OF OTHER COMPLICATIONS OF PREGNANCY, CHILDBIRTH AND THE PUERPERIUM: Chronic | Status: ACTIVE | Noted: 2020-12-14

## 2020-12-16 PROBLEM — M19.90 UNSPECIFIED OSTEOARTHRITIS, UNSPECIFIED SITE: Chronic | Status: ACTIVE | Noted: 2020-12-14

## 2020-12-20 ENCOUNTER — OUTPATIENT (OUTPATIENT)
Dept: OUTPATIENT SERVICES | Facility: HOSPITAL | Age: 55
LOS: 1 days | End: 2020-12-20
Payer: MEDICAID

## 2020-12-20 DIAGNOSIS — Z20.828 CONTACT WITH AND (SUSPECTED) EXPOSURE TO OTHER VIRAL COMMUNICABLE DISEASES: ICD-10-CM

## 2020-12-20 DIAGNOSIS — Z98.890 OTHER SPECIFIED POSTPROCEDURAL STATES: Chronic | ICD-10-CM

## 2020-12-20 DIAGNOSIS — Z98.891 HISTORY OF UTERINE SCAR FROM PREVIOUS SURGERY: Chronic | ICD-10-CM

## 2020-12-20 DIAGNOSIS — Z90.49 ACQUIRED ABSENCE OF OTHER SPECIFIED PARTS OF DIGESTIVE TRACT: Chronic | ICD-10-CM

## 2020-12-20 LAB — SARS-COV-2 RNA SPEC QL NAA+PROBE: SIGNIFICANT CHANGE UP

## 2020-12-20 PROCEDURE — U0003: CPT

## 2020-12-22 ENCOUNTER — TRANSCRIPTION ENCOUNTER (OUTPATIENT)
Age: 55
End: 2020-12-22

## 2020-12-22 RX ORDER — PANTOPRAZOLE 40 MG/1
40 TABLET, DELAYED RELEASE ORAL DAILY
Qty: 30 | Refills: 0 | Status: ACTIVE | COMMUNITY
Start: 2020-12-22 | End: 1900-01-01

## 2020-12-22 RX ORDER — ASPIRIN 81 MG/1
81 TABLET ORAL TWICE DAILY
Qty: 60 | Refills: 0 | Status: ACTIVE | COMMUNITY
Start: 2020-12-22 | End: 1900-01-01

## 2020-12-22 RX ORDER — PREGABALIN 75 MG/1
75 CAPSULE ORAL TWICE DAILY
Qty: 28 | Refills: 0 | Status: ACTIVE | COMMUNITY
Start: 2020-12-22 | End: 1900-01-01

## 2020-12-22 RX ORDER — OXYCODONE 5 MG/1
5 TABLET ORAL
Qty: 40 | Refills: 0 | Status: ACTIVE | COMMUNITY
Start: 2020-12-22 | End: 1900-01-01

## 2020-12-22 RX ORDER — NAPROXEN 500 MG/1
500 TABLET ORAL
Qty: 30 | Refills: 0 | Status: ACTIVE | COMMUNITY
Start: 2020-12-22 | End: 1900-01-01

## 2020-12-22 RX ORDER — TRAMADOL HYDROCHLORIDE 50 MG/1
50 TABLET, COATED ORAL
Qty: 40 | Refills: 0 | Status: ACTIVE | COMMUNITY
Start: 2020-12-22 | End: 1900-01-01

## 2020-12-23 ENCOUNTER — RESULT REVIEW (OUTPATIENT)
Age: 55
End: 2020-12-23

## 2020-12-23 ENCOUNTER — APPOINTMENT (OUTPATIENT)
Dept: ORTHOPEDIC SURGERY | Facility: HOSPITAL | Age: 55
End: 2020-12-23

## 2020-12-23 ENCOUNTER — OUTPATIENT (OUTPATIENT)
Dept: OUTPATIENT SERVICES | Facility: HOSPITAL | Age: 55
LOS: 1 days | End: 2020-12-23
Payer: MEDICAID

## 2020-12-23 VITALS
WEIGHT: 149.91 LBS | HEART RATE: 71 BPM | DIASTOLIC BLOOD PRESSURE: 77 MMHG | TEMPERATURE: 97 F | RESPIRATION RATE: 18 BRPM | OXYGEN SATURATION: 99 % | SYSTOLIC BLOOD PRESSURE: 135 MMHG | HEIGHT: 61 IN

## 2020-12-23 DIAGNOSIS — Z98.890 OTHER SPECIFIED POSTPROCEDURAL STATES: Chronic | ICD-10-CM

## 2020-12-23 DIAGNOSIS — Z90.49 ACQUIRED ABSENCE OF OTHER SPECIFIED PARTS OF DIGESTIVE TRACT: Chronic | ICD-10-CM

## 2020-12-23 DIAGNOSIS — Z01.818 ENCOUNTER FOR OTHER PREPROCEDURAL EXAMINATION: ICD-10-CM

## 2020-12-23 DIAGNOSIS — M17.11 UNILATERAL PRIMARY OSTEOARTHRITIS, RIGHT KNEE: ICD-10-CM

## 2020-12-23 DIAGNOSIS — Z98.891 HISTORY OF UTERINE SCAR FROM PREVIOUS SURGERY: Chronic | ICD-10-CM

## 2020-12-23 LAB
ANION GAP SERPL CALC-SCNC: 14 MMOL/L — SIGNIFICANT CHANGE UP (ref 5–17)
BUN SERPL-MCNC: 12 MG/DL — SIGNIFICANT CHANGE UP (ref 7–23)
CALCIUM SERPL-MCNC: 8.5 MG/DL — SIGNIFICANT CHANGE UP (ref 8.4–10.5)
CHLORIDE SERPL-SCNC: 102 MMOL/L — SIGNIFICANT CHANGE UP (ref 96–108)
CO2 SERPL-SCNC: 23 MMOL/L — SIGNIFICANT CHANGE UP (ref 22–31)
CREAT SERPL-MCNC: 0.61 MG/DL — SIGNIFICANT CHANGE UP (ref 0.5–1.3)
GLUCOSE SERPL-MCNC: 167 MG/DL — HIGH (ref 70–99)
HCT VFR BLD CALC: 41.4 % — SIGNIFICANT CHANGE UP (ref 34.5–45)
HGB BLD-MCNC: 13.6 G/DL — SIGNIFICANT CHANGE UP (ref 11.5–15.5)
MCHC RBC-ENTMCNC: 30.6 PG — SIGNIFICANT CHANGE UP (ref 27–34)
MCHC RBC-ENTMCNC: 32.9 GM/DL — SIGNIFICANT CHANGE UP (ref 32–36)
MCV RBC AUTO: 93.2 FL — SIGNIFICANT CHANGE UP (ref 80–100)
NRBC # BLD: 0 /100 WBCS — SIGNIFICANT CHANGE UP (ref 0–0)
PLATELET # BLD AUTO: 179 K/UL — SIGNIFICANT CHANGE UP (ref 150–400)
POTASSIUM SERPL-MCNC: 4.2 MMOL/L — SIGNIFICANT CHANGE UP (ref 3.5–5.3)
POTASSIUM SERPL-SCNC: 4.2 MMOL/L — SIGNIFICANT CHANGE UP (ref 3.5–5.3)
RBC # BLD: 4.44 M/UL — SIGNIFICANT CHANGE UP (ref 3.8–5.2)
RBC # FLD: 12.1 % — SIGNIFICANT CHANGE UP (ref 10.3–14.5)
SODIUM SERPL-SCNC: 139 MMOL/L — SIGNIFICANT CHANGE UP (ref 135–145)
WBC # BLD: 7.37 K/UL — SIGNIFICANT CHANGE UP (ref 3.8–10.5)
WBC # FLD AUTO: 7.37 K/UL — SIGNIFICANT CHANGE UP (ref 3.8–10.5)

## 2020-12-23 PROCEDURE — 73560 X-RAY EXAM OF KNEE 1 OR 2: CPT | Mod: 26,RT

## 2020-12-23 PROCEDURE — 20985 CPTR-ASST DIR MS PX: CPT

## 2020-12-23 PROCEDURE — 27447 TOTAL KNEE ARTHROPLASTY: CPT | Mod: RT

## 2020-12-23 RX ORDER — ACETAMINOPHEN 500 MG
1000 TABLET ORAL ONCE
Refills: 0 | Status: DISCONTINUED | OUTPATIENT
Start: 2020-12-24 | End: 2021-01-06

## 2020-12-23 RX ORDER — HYDROMORPHONE HYDROCHLORIDE 2 MG/ML
0.5 INJECTION INTRAMUSCULAR; INTRAVENOUS; SUBCUTANEOUS ONCE
Refills: 0 | Status: DISCONTINUED | OUTPATIENT
Start: 2020-12-23 | End: 2020-12-23

## 2020-12-23 RX ORDER — PANTOPRAZOLE SODIUM 20 MG/1
40 TABLET, DELAYED RELEASE ORAL
Refills: 0 | Status: DISCONTINUED | OUTPATIENT
Start: 2020-12-23 | End: 2021-01-06

## 2020-12-23 RX ORDER — OXYCODONE HYDROCHLORIDE 5 MG/1
10 TABLET ORAL EVERY 4 HOURS
Refills: 0 | Status: DISCONTINUED | OUTPATIENT
Start: 2020-12-23 | End: 2020-12-24

## 2020-12-23 RX ORDER — TRAMADOL HYDROCHLORIDE 50 MG/1
50 TABLET ORAL EVERY 6 HOURS
Refills: 0 | Status: DISCONTINUED | OUTPATIENT
Start: 2020-12-23 | End: 2020-12-24

## 2020-12-23 RX ORDER — ACETAMINOPHEN 500 MG
1000 TABLET ORAL ONCE
Refills: 0 | Status: COMPLETED | OUTPATIENT
Start: 2020-12-23 | End: 2020-12-23

## 2020-12-23 RX ORDER — SODIUM CHLORIDE 9 MG/ML
1000 INJECTION, SOLUTION INTRAVENOUS ONCE
Refills: 0 | Status: COMPLETED | OUTPATIENT
Start: 2020-12-23 | End: 2020-12-24

## 2020-12-23 RX ORDER — VANCOMYCIN HCL 1 G
1000 VIAL (EA) INTRAVENOUS ONCE
Refills: 0 | Status: COMPLETED | OUTPATIENT
Start: 2020-12-24 | End: 2020-12-23

## 2020-12-23 RX ORDER — SODIUM CHLORIDE 9 MG/ML
1000 INJECTION, SOLUTION INTRAVENOUS
Refills: 0 | Status: DISCONTINUED | OUTPATIENT
Start: 2020-12-23 | End: 2021-01-06

## 2020-12-23 RX ORDER — SODIUM CHLORIDE 9 MG/ML
1000 INJECTION, SOLUTION INTRAVENOUS ONCE
Refills: 0 | Status: COMPLETED | OUTPATIENT
Start: 2020-12-23 | End: 2020-12-23

## 2020-12-23 RX ORDER — OXYCODONE HYDROCHLORIDE 5 MG/1
5 TABLET ORAL EVERY 4 HOURS
Refills: 0 | Status: DISCONTINUED | OUTPATIENT
Start: 2020-12-23 | End: 2020-12-23

## 2020-12-23 RX ORDER — ONDANSETRON 8 MG/1
4 TABLET, FILM COATED ORAL EVERY 6 HOURS
Refills: 0 | Status: DISCONTINUED | OUTPATIENT
Start: 2020-12-23 | End: 2021-01-06

## 2020-12-23 RX ORDER — ACETAMINOPHEN 500 MG
1000 TABLET ORAL ONCE
Refills: 0 | Status: COMPLETED | OUTPATIENT
Start: 2020-12-24 | End: 2020-12-24

## 2020-12-23 RX ORDER — TRAMADOL HYDROCHLORIDE 50 MG/1
50 TABLET ORAL ONCE
Refills: 0 | Status: DISCONTINUED | OUTPATIENT
Start: 2020-12-23 | End: 2020-12-23

## 2020-12-23 RX ORDER — CHLORHEXIDINE GLUCONATE 213 G/1000ML
1 SOLUTION TOPICAL ONCE
Refills: 0 | Status: COMPLETED | OUTPATIENT
Start: 2020-12-23 | End: 2020-12-23

## 2020-12-23 RX ORDER — PANTOPRAZOLE SODIUM 20 MG/1
40 TABLET, DELAYED RELEASE ORAL ONCE
Refills: 0 | Status: COMPLETED | OUTPATIENT
Start: 2020-12-23 | End: 2020-12-23

## 2020-12-23 RX ORDER — KETOROLAC TROMETHAMINE 30 MG/ML
15 SYRINGE (ML) INJECTION EVERY 6 HOURS
Refills: 0 | Status: COMPLETED | OUTPATIENT
Start: 2020-12-23 | End: 2020-12-24

## 2020-12-23 RX ORDER — VANCOMYCIN HCL 1 G
1000 VIAL (EA) INTRAVENOUS ONCE
Refills: 0 | Status: COMPLETED | OUTPATIENT
Start: 2020-12-23 | End: 2020-12-23

## 2020-12-23 RX ORDER — LEVETIRACETAM 250 MG/1
1500 TABLET, FILM COATED ORAL AT BEDTIME
Refills: 0 | Status: DISCONTINUED | OUTPATIENT
Start: 2020-12-23 | End: 2021-01-06

## 2020-12-23 RX ORDER — HYDROMORPHONE HYDROCHLORIDE 2 MG/ML
0.5 INJECTION INTRAMUSCULAR; INTRAVENOUS; SUBCUTANEOUS ONCE
Refills: 0 | Status: COMPLETED | OUTPATIENT
Start: 2020-12-23

## 2020-12-23 RX ADMIN — HYDROMORPHONE HYDROCHLORIDE 0.5 MILLIGRAM(S): 2 INJECTION INTRAMUSCULAR; INTRAVENOUS; SUBCUTANEOUS at 21:00

## 2020-12-23 RX ADMIN — HYDROMORPHONE HYDROCHLORIDE 0.5 MILLIGRAM(S): 2 INJECTION INTRAMUSCULAR; INTRAVENOUS; SUBCUTANEOUS at 17:15

## 2020-12-23 RX ADMIN — TRAMADOL HYDROCHLORIDE 50 MILLIGRAM(S): 50 TABLET ORAL at 11:49

## 2020-12-23 RX ADMIN — HYDROMORPHONE HYDROCHLORIDE 0.5 MILLIGRAM(S): 2 INJECTION INTRAMUSCULAR; INTRAVENOUS; SUBCUTANEOUS at 16:25

## 2020-12-23 RX ADMIN — Medication 125 MILLIGRAM(S): at 23:59

## 2020-12-23 RX ADMIN — Medication 400 MILLIGRAM(S): at 19:54

## 2020-12-23 RX ADMIN — OXYCODONE HYDROCHLORIDE 5 MILLIGRAM(S): 5 TABLET ORAL at 17:05

## 2020-12-23 RX ADMIN — HYDROMORPHONE HYDROCHLORIDE 0.5 MILLIGRAM(S): 2 INJECTION INTRAMUSCULAR; INTRAVENOUS; SUBCUTANEOUS at 16:40

## 2020-12-23 RX ADMIN — SODIUM CHLORIDE 1000 MILLILITER(S): 9 INJECTION, SOLUTION INTRAVENOUS at 19:35

## 2020-12-23 RX ADMIN — Medication 250 MILLIGRAM(S): at 11:50

## 2020-12-23 RX ADMIN — HYDROMORPHONE HYDROCHLORIDE 0.5 MILLIGRAM(S): 2 INJECTION INTRAMUSCULAR; INTRAVENOUS; SUBCUTANEOUS at 20:55

## 2020-12-23 RX ADMIN — Medication 15 MILLIGRAM(S): at 21:36

## 2020-12-23 RX ADMIN — Medication 15 MILLIGRAM(S): at 22:00

## 2020-12-23 RX ADMIN — CHLORHEXIDINE GLUCONATE 1 APPLICATION(S): 213 SOLUTION TOPICAL at 11:49

## 2020-12-23 RX ADMIN — ONDANSETRON 4 MILLIGRAM(S): 8 TABLET, FILM COATED ORAL at 16:25

## 2020-12-23 RX ADMIN — Medication 150 MILLIGRAM(S): at 11:50

## 2020-12-23 RX ADMIN — SODIUM CHLORIDE 1000 MILLILITER(S): 9 INJECTION, SOLUTION INTRAVENOUS at 16:09

## 2020-12-23 RX ADMIN — PANTOPRAZOLE SODIUM 40 MILLIGRAM(S): 20 TABLET, DELAYED RELEASE ORAL at 11:49

## 2020-12-23 RX ADMIN — LEVETIRACETAM 1500 MILLIGRAM(S): 250 TABLET, FILM COATED ORAL at 21:17

## 2020-12-23 NOTE — CHART NOTE - NSCHARTNOTEFT_GEN_A_CORE
Patient seen bedside, Complaints of R knee incisional "shooting pain" however, reports relief after receiving IV pain meds.  No Chest Pain, SOB, N/V.    T(C): 36.4 (12-23-20 @ 17:20), Max: 36.4 (12-23-20 @ 17:20)  HR: 75 (12-23-20 @ 19:45) (71 - 97)  BP: 119/84 (12-23-20 @ 19:45) (118/59 - 157/74)  RR: 12 (12-23-20 @ 17:35) (12 - 20)  SpO2: 96% (12-23-20 @ 19:45) (94% - 100%)      Exam:  Alert and Amoret, No Acute Distress  Card: +S1/S2, RRR  Pulm: CTAB  Laterality: R Knee  Dressing: Aquacel c/d/i  Calves soft, non-tender bilaterally  +PF/DF/EHL/FHL  SILT  + DP pulse    Xray: IMPRESSION: There is an interval right total knee arthroplasty with noncemented components in the expected position. There is no acute periprosthetic fracture. Soft tissue swelling and air are consistent with recent operative change.                            13.6   7.37  )-----------( 179      ( 23 Dec 2020 16:15 )             41.4    12-23    139  |  102  |  12  ----------------------------<  167<H>  4.2   |  23  |  0.61    Ca    8.5      23 Dec 2020 16:15        A/P: 55y Female S/p R Total Knee Arthroplasty. VSS. NAD  -PT/OT-WBAT RLE; PT to clear tomorrow AM  -IS  -DVT PPx: ASA 81mg PO BID and SCDs  -Pain Control  -Continue Current Tx  -Dispo planning home tomorrow AM after PT clearance    Wilman Cordova PA-C  Orthopedic Surgery Team  Team Pager #[phone]

## 2020-12-23 NOTE — ASU PATIENT PROFILE, ADULT - PMH
Globus pharyngeus  h/o  H/O placenta previa    OA (osteoarthritis)    OP (osteoporosis)    Seizure  last episode 2016 (missed dose)

## 2020-12-23 NOTE — PHYSICAL THERAPY INITIAL EVALUATION ADULT - GAIT DEVIATIONS NOTED, PT EVAL
decreased hilda/decreased velocity of limb motion/decreased step length/decreased weight-shifting ability

## 2020-12-23 NOTE — PHYSICAL THERAPY INITIAL EVALUATION ADULT - IMPAIRMENTS CONTRIBUTING TO GAIT DEVIATIONS, PT EVAL
no complications impaired balance/impaired postural control/decreased strength no complications no complications

## 2020-12-23 NOTE — PHYSICAL THERAPY INITIAL EVALUATION ADULT - PERTINENT HX OF CURRENT PROBLEM, REHAB EVAL
Pt is a 56 y/o female  presenting to ambulatory surgery on 12/23/20 with h/o seizure (last episode 2016) OA, c/o right knee pain he pain substantially limits activities of daily living. Walking tolerance is reduced, s/p cortisone injections (last 8/2020) and PT which have not helped. Pt is now s/p R TKA

## 2020-12-23 NOTE — ASU PATIENT PROFILE, ADULT - PSH
H/O arthroscopy of knee  Right    H/O arthroscopy of shoulder  2010 Right  H/O hand surgery  2017 Left hand  H/O:     S/P cholecystectomy  > 15 years ago

## 2020-12-23 NOTE — ASU DISCHARGE PLAN (ADULT/PEDIATRIC) - ASU DC SPECIAL INSTRUCTIONSFT
See instructions packet provided by Dr. Merritt    Take Aspirin 81 mg two times a day  Other prescriptions also sent to pharmacy

## 2020-12-23 NOTE — PHYSICAL THERAPY INITIAL EVALUATION ADULT - PLANNED THERAPY INTERVENTIONS, PT EVAL
stair negotiation: GOAL: Pt will be able to negotiate 10 steps +HR independently with reciprocal pattern in 2 weeks./bed mobility training/gait training/transfer training

## 2020-12-23 NOTE — PHYSICAL THERAPY INITIAL EVALUATION ADULT - ADDITIONAL COMMENTS
Pt lives in a private house with spouse with 6 steps to enter. Pt was Ind with all ADLs and amb without AD.

## 2020-12-24 ENCOUNTER — NON-APPOINTMENT (OUTPATIENT)
Age: 55
End: 2020-12-24

## 2020-12-24 VITALS
RESPIRATION RATE: 18 BRPM | DIASTOLIC BLOOD PRESSURE: 74 MMHG | HEART RATE: 63 BPM | SYSTOLIC BLOOD PRESSURE: 106 MMHG | OXYGEN SATURATION: 98 %

## 2020-12-24 PROCEDURE — 97165 OT EVAL LOW COMPLEX 30 MIN: CPT

## 2020-12-24 PROCEDURE — 97116 GAIT TRAINING THERAPY: CPT

## 2020-12-24 PROCEDURE — 80048 BASIC METABOLIC PNL TOTAL CA: CPT

## 2020-12-24 PROCEDURE — 86901 BLOOD TYPING SEROLOGIC RH(D): CPT

## 2020-12-24 PROCEDURE — C1776: CPT

## 2020-12-24 PROCEDURE — 86900 BLOOD TYPING SEROLOGIC ABO: CPT

## 2020-12-24 PROCEDURE — 82962 GLUCOSE BLOOD TEST: CPT

## 2020-12-24 PROCEDURE — 27447 TOTAL KNEE ARTHROPLASTY: CPT | Mod: RT

## 2020-12-24 PROCEDURE — 97161 PT EVAL LOW COMPLEX 20 MIN: CPT

## 2020-12-24 PROCEDURE — 86850 RBC ANTIBODY SCREEN: CPT

## 2020-12-24 PROCEDURE — 73560 X-RAY EXAM OF KNEE 1 OR 2: CPT

## 2020-12-24 PROCEDURE — 85027 COMPLETE CBC AUTOMATED: CPT

## 2020-12-24 PROCEDURE — 97110 THERAPEUTIC EXERCISES: CPT

## 2020-12-24 PROCEDURE — 97530 THERAPEUTIC ACTIVITIES: CPT

## 2020-12-24 PROCEDURE — 20985 CPTR-ASST DIR MS PX: CPT

## 2020-12-24 RX ORDER — NAPROXEN 500 MG/1
500 TABLET ORAL
Qty: 40 | Refills: 0 | Status: ACTIVE | COMMUNITY
Start: 2020-12-24 | End: 1900-01-01

## 2020-12-24 RX ORDER — PANTOPRAZOLE 40 MG/1
40 TABLET, DELAYED RELEASE ORAL DAILY
Qty: 30 | Refills: 0 | Status: ACTIVE | COMMUNITY
Start: 2020-12-24 | End: 1900-01-01

## 2020-12-24 RX ORDER — ASPIRIN 81 MG/1
81 TABLET ORAL
Qty: 60 | Refills: 0 | Status: ACTIVE | COMMUNITY
Start: 2020-12-24 | End: 1900-01-01

## 2020-12-24 RX ORDER — ACETAMINOPHEN 500 MG
325 TABLET ORAL ONCE
Refills: 0 | Status: DISCONTINUED | OUTPATIENT
Start: 2020-12-24 | End: 2020-12-24

## 2020-12-24 RX ORDER — OXYCODONE 5 MG/1
5 TABLET ORAL
Qty: 40 | Refills: 0 | Status: ACTIVE | COMMUNITY
Start: 2020-12-24 | End: 1900-01-01

## 2020-12-24 RX ORDER — ASPIRIN/CALCIUM CARB/MAGNESIUM 324 MG
81 TABLET ORAL ONCE
Refills: 0 | Status: COMPLETED | OUTPATIENT
Start: 2020-12-24 | End: 2020-12-24

## 2020-12-24 RX ORDER — ACETAMINOPHEN 500 MG
975 TABLET ORAL ONCE
Refills: 0 | Status: COMPLETED | OUTPATIENT
Start: 2020-12-24 | End: 2020-12-24

## 2020-12-24 RX ADMIN — Medication 975 MILLIGRAM(S): at 07:58

## 2020-12-24 RX ADMIN — Medication 15 MILLIGRAM(S): at 05:37

## 2020-12-24 RX ADMIN — Medication 81 MILLIGRAM(S): at 05:46

## 2020-12-24 RX ADMIN — Medication 75 MILLIGRAM(S): at 10:19

## 2020-12-24 RX ADMIN — PANTOPRAZOLE SODIUM 40 MILLIGRAM(S): 20 TABLET, DELAYED RELEASE ORAL at 05:47

## 2020-12-24 RX ADMIN — TRAMADOL HYDROCHLORIDE 50 MILLIGRAM(S): 50 TABLET ORAL at 05:46

## 2020-12-24 RX ADMIN — SODIUM CHLORIDE 1000 MILLILITER(S): 9 INJECTION, SOLUTION INTRAVENOUS at 05:38

## 2020-12-24 RX ADMIN — OXYCODONE HYDROCHLORIDE 10 MILLIGRAM(S): 5 TABLET ORAL at 08:42

## 2020-12-24 RX ADMIN — Medication 400 MILLIGRAM(S): at 02:14

## 2020-12-24 RX ADMIN — Medication 1000 MILLIGRAM(S): at 03:37

## 2020-12-24 NOTE — OCCUPATIONAL THERAPY INITIAL EVALUATION ADULT - ADDITIONAL COMMENTS
R knee xray 12/23- There is an interval right total knee arthroplasty with noncemented components in the expected position. There is no acute periprosthetic fracture. Soft tissue swelling and air are consistent with recent operative change.

## 2020-12-24 NOTE — OCCUPATIONAL THERAPY INITIAL EVALUATION ADULT - BALANCE TRAINING, PT EVAL
Pt will increase dynamic standing balance 1/2 grade to increase safety/independence with functional transfers and ADLs within 1-2 sessions

## 2020-12-24 NOTE — PROGRESS NOTE ADULT - SUBJECTIVE AND OBJECTIVE BOX
Post op Day [1]    Patient resting without complaints.  Reports feeling better this AM and more pain controlled.  No chest pain, SOB, N/V.    T(C): 36.9 (12-23-20 @ 20:00), Max: 36.9 (12-23-20 @ 20:00)  HR: 81 (12-24-20 @ 06:00) (71 - 97)  BP: 112/67 (12-24-20 @ 06:00) (106/75 - 157/74)  RR: 20 (12-24-20 @ 06:00) (12 - 20)  SpO2: 100% (12-24-20 @ 06:00) (94% - 100%)  Wt(kg): --    Exam:  Alert and Oriented, No Acute Distress  Lower Extremities: R Knee  Dressing: C/D/I w/ Aquacel  Calves Soft, Non-tender bilaterally  +PF/DF/EHL/FHL  SILT  +DP Pulse                              13.6   7.37  )-----------( 179      ( 23 Dec 2020 16:15 )             41.4    12-23    139  |  102  |  12  ----------------------------<  167<H>  4.2   |  23  |  0.61    Ca    8.5      23 Dec 2020 16:15          Team Pager: #[phone]

## 2020-12-24 NOTE — PROGRESS NOTE ADULT - ASSESSMENT
A/p: 55y Female s/p R Total Knee Arthroplasty.  VSS. NAD.    PT/OT-WBAT RLE, PT to clear today   IS  DVT PPx: ASA 81mg PO BID  Pain Control  Continue Current Tx.  DIspo planning to home this AM after PT cleared    Wilman Cordova PA-C  Orthopedic Surgery Team  Team oager #[phone] A/p: 55y Female s/p R Total Knee Arthroplasty.  VSS. NAD.    PT/OT-WBAT RLE, PT to clear today   IS  DVT PPx: ASA 81mg PO BID x 30 days  Pain Control  Continue Current Tx.  DIspo planning to home this AM after PT cleared    Wilman Cordova PA-C  Orthopedic Surgery Team  Team pager #[phone]

## 2020-12-25 ENCOUNTER — NON-APPOINTMENT (OUTPATIENT)
Age: 55
End: 2020-12-25

## 2021-01-06 ENCOUNTER — APPOINTMENT (OUTPATIENT)
Dept: ORTHOPEDIC SURGERY | Facility: CLINIC | Age: 56
End: 2021-01-06
Payer: MEDICAID

## 2021-01-06 VITALS — TEMPERATURE: 97.2 F

## 2021-01-06 DIAGNOSIS — M17.0 BILATERAL PRIMARY OSTEOARTHRITIS OF KNEE: ICD-10-CM

## 2021-01-06 DIAGNOSIS — M25.561 PAIN IN RIGHT KNEE: ICD-10-CM

## 2021-01-06 DIAGNOSIS — M17.11 UNILATERAL PRIMARY OSTEOARTHRITIS, RIGHT KNEE: ICD-10-CM

## 2021-01-06 PROCEDURE — 99024 POSTOP FOLLOW-UP VISIT: CPT

## 2021-01-06 PROCEDURE — 73564 X-RAY EXAM KNEE 4 OR MORE: CPT | Mod: RT

## 2021-01-06 RX ORDER — OXYCODONE 5 MG/1
5 TABLET ORAL
Qty: 40 | Refills: 0 | Status: ACTIVE | COMMUNITY
Start: 2021-01-06 | End: 1900-01-01

## 2021-01-06 RX ORDER — MORPHINE SULFATE 15 MG/1
15 TABLET, FILM COATED, EXTENDED RELEASE ORAL
Qty: 28 | Refills: 0 | Status: ACTIVE | COMMUNITY
Start: 2021-01-06 | End: 1900-01-01

## 2021-01-06 NOTE — REASON FOR VISIT
[Post Operative Visit] : a post operative visit for [Artificial Knee Joint] : artificial knee joint [Spouse] : spouse

## 2021-01-06 NOTE — DISCUSSION/SUMMARY
[de-identified] : The patient is doing well after right total knee arthroplasty. Written infectious precautions were reviewed. The patient will start with physical therapy tomorrow and work on improving her range of motion.  I had a very serious conversation with the patient and her  today about the fact that if she is not going to be compliant that she is going to have a poor outcome from her total knee arthroplasty.  She will develop arthrofibrosis if she does not start bending her knee.  Explained to her that she has her own worst enemy here because she is fearful of pain if she bends her knee.  Explained to her that she needs to take the pain medication that I prescribed and effectively prescribed oxycodone as well as MS Contin today to facilitate her pain relief.  I reviewed the entire pain medication protocol on several occurrences today on the same day as we have been doing frequently since surgery with her and her  today.  Explained to the patient that if she does not start improving her range of motion I will need to take her back to the operating room for a manipulation under anesthesia.  She understands that the first 6 weeks of healing is when she has the ability to improve her outcome overall and if she does not start becoming compliant she is going to have a permanently poor outcome for her total knee arthroplasty.  She will work on transitioning from the walker to the cane.  Continue with aspirin for DVT thromboembolism prophylaxis.  Follow-up in 2 weeks for range of motion check.  \par \par Using the website [email or web address]., the [place] prescription monitoring program registry was searched for this patient. The confidential drug utilization report was reviewed prior to the controlled substance prescription being given to the patient.

## 2021-01-06 NOTE — PHYSICAL EXAM
[de-identified] : Patient is well nourished, well-developed, in no acute distress, with appropriate mood and affect. The patient is oriented to time, place, and person. Respirations are even and unlabored. Examination reveals satisfactory wound healing. No surrounding erythema. Motion is good and relatively pain free. Active knee flexion is 10-50 but with coaxing I can range her from 10-70.  Able to straight leg raise. [de-identified] : AP, lateral, sunrise knee x-rays of the right knee were ordered and obtained in the office and demonstrate satisfactory position and alignment of the components are present. No signs of loosening are seen.

## 2021-01-06 NOTE — HISTORY OF PRESENT ILLNESS
[de-identified] : Status-post right total knee arthroplasty here for initial postoperative evaluation.  She is now making good progress and she is not having controlled pain.  I spent more than 30 minutes with the patient today.  I had a long discussion with her and her .  The patient is not taking any of the medications that been prescribed for pain relief.  We including myself, my physician assistant, and other partners in the office have had multiple discussions with the patient, her  and her daughter since surgery regarding proper pain management protocol.  The patient still denies understanding what to take for pain management.  She even brings in with her today my written instructions on specifically which medications to take, have any milligrams take and how often to take these medications and when asked her why she is not taking them she says that she did not realize that she is supposed to take these medications.  Again today I reviewed these medications with the patient writing these out for her and explained this again to her .  She is ambulating with a walker.  She has not been doing physical therapy and it is unclear why she has not been doing any therapy.  No fevers or chills.

## 2021-01-20 ENCOUNTER — APPOINTMENT (OUTPATIENT)
Dept: ORTHOPEDIC SURGERY | Facility: CLINIC | Age: 56
End: 2021-01-20
Payer: MEDICAID

## 2021-01-20 VITALS — TEMPERATURE: 97.2 F

## 2021-01-20 DIAGNOSIS — Z00.00 ENCOUNTER FOR GENERAL ADULT MEDICAL EXAMINATION W/OUT ABNORMAL FINDINGS: ICD-10-CM

## 2021-01-20 PROCEDURE — 99024 POSTOP FOLLOW-UP VISIT: CPT

## 2021-01-20 RX ORDER — NAPROXEN 500 MG/1
500 TABLET ORAL
Qty: 40 | Refills: 0 | Status: ACTIVE | COMMUNITY
Start: 2021-01-20 | End: 1900-01-01

## 2021-01-20 RX ORDER — MORPHINE SULFATE 15 MG/1
15 TABLET, FILM COATED, EXTENDED RELEASE ORAL
Qty: 28 | Refills: 0 | Status: ACTIVE | COMMUNITY
Start: 2021-01-20 | End: 1900-01-01

## 2021-01-20 RX ORDER — OXYCODONE 5 MG/1
5 TABLET ORAL
Qty: 40 | Refills: 0 | Status: ACTIVE | COMMUNITY
Start: 2021-01-20 | End: 1900-01-01

## 2021-01-20 NOTE — HISTORY OF PRESENT ILLNESS
[de-identified] : Status-post right total knee arthroplasty here for 4-week postoperative evaluation.  Her pain is better controlled she tells me today.  However on further questioning she still not taking the pain medications as prescribed even though both my physician assistant and I wrote this down for her and gave her schedule at her last visit.  She is still taking the Tylenol on an as-needed basis as opposed to around-the-clock and she is only taking her oxycodone after physical therapy rather than before physical therapy.  She admits that she had not taken the aspirin until she was 3 to 4 days after surgery.  However she is taken the aspirin now for DVT thromboembolism prophylaxis.  No shortness of breath.  She feels like she is making some progress and is now ambulating without an assistive device.  She still working with home physical therapy and I encouraged her to start outpatient physical therapy.  I spent more than 45 minutes with the patient today.  I had a long discussion with her and her .  No fevers or chills.

## 2021-01-20 NOTE — PHYSICAL EXAM
[de-identified] : Patient is well nourished, well-developed, in no acute distress, with appropriate mood and affect. The patient is oriented to time, place, and person. Respirations are even and unlabored. Examination reveals satisfactory wound healing. No surrounding erythema. Motion is painless within the range that she is able to achieve. Active knee flexion is 15-60 but with coaxing I can range her from 10-70.  Able to straight leg raise.

## 2021-01-20 NOTE — DISCUSSION/SUMMARY
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  This is because she is not taking her pain medications appropriately and she is guarding with physical therapy because she is not taking narcotic until after physical therapy as opposed to before physical therapy.  We had a long discussion about how her subconscious is preventing her from bending her knee or extending her knee because of her fear of pain.  Her anxiety is unfortunately getting the better of her.  She is also admitting to not sleeping with her knee in extension at night and she is bending the knee as opposed to keeping it straight on a pillow per my instructions.  Explained to her how her noncompliance is going to cause a bad outcome with a stiff knee that may become permanently stiff.  I explained to her that this is the time that we have to achieve improved range of motion.  My recommendation is to return to the operating for manipulation under anesthesia.  The meantime I renewed her oxycodone and MS Contin 15 mg twice a day.  Naproxen was renewed.  Using the website [email or web address] the [place] prescription monitoring program registry was searched for this patient. The confidential drug utilization report was reviewed prior to the controlled substance prescription being given to the patient.  She has to complete her aspirin for DVT thromboembolism prophylaxis.  She understand that she should be taking the MS Contin standing and to be taken the oxycodone at least 45 minutes prior to physical therapy to help manage the pain that she is experiencing during physical therapy.  I also gave her home exercise program and gave her instructions on squatting and bending the knee.  I also gave her a Merrick brace that should be locked in extension and explained to her how to don and doff this brace which she should wear at night to keep her knee in extension.\par \par The patient is an appropriate candidate for a right knee manipulation under anesthesia. An extensive discussion was conducted of the natural history of the arthrofibrosis (stiffness) and the variety of surgical and non-surgical treatment options available to the patient. A risk/benefit analysis was discussed with the patient reviewing the advantages and disadvantages of surgical intervention at this time. The patient's failure to adequately respond to conservative treatment has made additional conservative treatment measures consisting of physical therapy and/or bracing contraindicated. A full explanation was given of the nature and the purpose of the procedure and anesthesia, its benefits, possible alternative methods of diagnosis of treatment, the risks involved, the possibility of complications, the foreseeable consequences of the procedure and the possible results of the non-treatment. \par \par No guarantee or assurance was made as to the results that may be obtained. Specifically, the risks were identified to include, but are not limited, to the following: Fracture, soft-tissue damage, pulmonary embolism, death, paralysis, dislocation, pain, stiffness, instability, limp, weakness, breakage, uncontrolled bleeding, nerve injury, blood vessel injury, pressure sores, anesthetic risks, delayed healing of wound and bone, and wear and loosening. Further discussion was undertaken with the patient about the details of surgical preparation, treatment and postoperative rehabilitation including medical clearance, autotransfusion, the hospital course and the postoperative rehabilitation involved. All in all, I feel that this patient is a good candidate for manipulation under anesthesia.\par \par I also explained to the patient that she needs to immediately start outpatient physical therapy and stop home physical therapy.  Gave her a new prescription for this for aggressive physical therapy.\par \par The patient and her  both expressed understanding.  I gave her written instructions on how to do her pain medications and to go to physical therapy.  We will plan an JOSE within the next 7 to 10 days.

## 2021-01-21 ENCOUNTER — OUTPATIENT (OUTPATIENT)
Dept: OUTPATIENT SERVICES | Facility: HOSPITAL | Age: 56
LOS: 1 days | End: 2021-01-21
Payer: MEDICAID

## 2021-01-21 VITALS
WEIGHT: 145.95 LBS | SYSTOLIC BLOOD PRESSURE: 133 MMHG | RESPIRATION RATE: 17 BRPM | TEMPERATURE: 98 F | DIASTOLIC BLOOD PRESSURE: 78 MMHG | HEART RATE: 74 BPM | HEIGHT: 61 IN | OXYGEN SATURATION: 97 %

## 2021-01-21 DIAGNOSIS — Z01.818 ENCOUNTER FOR OTHER PREPROCEDURAL EXAMINATION: ICD-10-CM

## 2021-01-21 DIAGNOSIS — Z98.890 OTHER SPECIFIED POSTPROCEDURAL STATES: Chronic | ICD-10-CM

## 2021-01-21 DIAGNOSIS — Z90.49 ACQUIRED ABSENCE OF OTHER SPECIFIED PARTS OF DIGESTIVE TRACT: Chronic | ICD-10-CM

## 2021-01-21 DIAGNOSIS — M24.661 ANKYLOSIS, RIGHT KNEE: ICD-10-CM

## 2021-01-21 DIAGNOSIS — Z98.891 HISTORY OF UTERINE SCAR FROM PREVIOUS SURGERY: Chronic | ICD-10-CM

## 2021-01-21 PROCEDURE — G0463: CPT

## 2021-01-21 PROCEDURE — 85027 COMPLETE CBC AUTOMATED: CPT

## 2021-01-21 NOTE — H&P PST ADULT - NSICDXPROBLEM_GEN_ALL_CORE_FT
PROBLEM DIAGNOSES  Problem: Ankylosis, right knee  Assessment and Plan: right knee manipulation under anesthesia

## 2021-01-21 NOTE — H&P PST ADULT - HISTORY OF PRESENT ILLNESS
56 year old female with hx of primary osteoarthritis right knee s/p total knee replacement now with right knee swelling limited range of motion for manipulation under anesthesia  56 year old female with hx of primary osteoarthritis right knee s/p total knee replacement now with right knee swelling limited range of motion for manipulation under anesthesia.    ****COVID  denies recent travel outside [place]  denies known exposure  denies s/s  swab 1/24 [place]

## 2021-01-21 NOTE — H&P PST ADULT - NSICDXPASTSURGICALHX_GEN_ALL_CORE_FT
PAST SURGICAL HISTORY:  H/O arthroscopy of knee Right      H/O arthroscopy of shoulder 2010 Right    H/O hand surgery 2017 Left hand    H/O:      S/P cholecystectomy > 15 years ago    S/P right knee surgery total knee 2020

## 2021-01-21 NOTE — H&P PST ADULT - NSICDXPASTMEDICALHX_GEN_ALL_CORE_FT
PAST MEDICAL HISTORY:  Globus pharyngeus h/o    H/O placenta previa     OA (osteoarthritis)     OP (osteoporosis)     Seizure last episode 2016 (missed dose)     PAST MEDICAL HISTORY:  Ankylosis, right knee     COVID-19 March 2020   home quarantine   mild symptoms    Globus pharyngeus h/o    H/O placenta previa     OA (osteoarthritis)     OP (osteoporosis)     Seizure last episode 2016 (missed dose)

## 2021-01-21 NOTE — H&P PST ADULT - MUSCULOSKELETAL COMMENTS
swelling redness at incision site. incision is closed. limited ability to use difficulty straighten/bend/ right knee

## 2021-01-22 PROBLEM — M24.661 ANKYLOSIS, RIGHT KNEE: Chronic | Status: ACTIVE | Noted: 2021-01-21

## 2021-01-22 PROBLEM — U07.1 COVID-19: Chronic | Status: ACTIVE | Noted: 2021-01-21

## 2021-01-24 ENCOUNTER — OUTPATIENT (OUTPATIENT)
Dept: OUTPATIENT SERVICES | Facility: HOSPITAL | Age: 56
LOS: 1 days | End: 2021-01-24
Payer: MEDICAID

## 2021-01-24 DIAGNOSIS — Z98.890 OTHER SPECIFIED POSTPROCEDURAL STATES: Chronic | ICD-10-CM

## 2021-01-24 DIAGNOSIS — Z11.52 ENCOUNTER FOR SCREENING FOR COVID-19: ICD-10-CM

## 2021-01-24 DIAGNOSIS — Z90.49 ACQUIRED ABSENCE OF OTHER SPECIFIED PARTS OF DIGESTIVE TRACT: Chronic | ICD-10-CM

## 2021-01-24 DIAGNOSIS — Z98.891 HISTORY OF UTERINE SCAR FROM PREVIOUS SURGERY: Chronic | ICD-10-CM

## 2021-01-24 LAB — SARS-COV-2 RNA SPEC QL NAA+PROBE: SIGNIFICANT CHANGE UP

## 2021-01-24 PROCEDURE — U0005: CPT

## 2021-01-24 PROCEDURE — U0003: CPT

## 2021-01-24 PROCEDURE — C9803: CPT

## 2021-01-26 ENCOUNTER — TRANSCRIPTION ENCOUNTER (OUTPATIENT)
Age: 56
End: 2021-01-26

## 2021-01-26 RX ORDER — IBUPROFEN 200 MG
600 TABLET ORAL ONCE
Refills: 0 | Status: DISCONTINUED | OUTPATIENT
Start: 2021-01-27 | End: 2021-02-10

## 2021-01-26 RX ORDER — SODIUM CHLORIDE 9 MG/ML
1000 INJECTION, SOLUTION INTRAVENOUS
Refills: 0 | Status: DISCONTINUED | OUTPATIENT
Start: 2021-01-27 | End: 2021-02-10

## 2021-01-27 ENCOUNTER — RESULT REVIEW (OUTPATIENT)
Age: 56
End: 2021-01-27

## 2021-01-27 ENCOUNTER — OUTPATIENT (OUTPATIENT)
Dept: OUTPATIENT SERVICES | Facility: HOSPITAL | Age: 56
LOS: 1 days | End: 2021-01-27
Payer: MEDICAID

## 2021-01-27 ENCOUNTER — APPOINTMENT (OUTPATIENT)
Dept: ORTHOPEDIC SURGERY | Facility: HOSPITAL | Age: 56
End: 2021-01-27

## 2021-01-27 VITALS
OXYGEN SATURATION: 99 % | HEART RATE: 85 BPM | HEIGHT: 61 IN | RESPIRATION RATE: 16 BRPM | SYSTOLIC BLOOD PRESSURE: 133 MMHG | TEMPERATURE: 98 F | DIASTOLIC BLOOD PRESSURE: 83 MMHG | WEIGHT: 145.95 LBS

## 2021-01-27 VITALS
SYSTOLIC BLOOD PRESSURE: 140 MMHG | RESPIRATION RATE: 16 BRPM | HEART RATE: 86 BPM | OXYGEN SATURATION: 100 % | TEMPERATURE: 98 F | DIASTOLIC BLOOD PRESSURE: 72 MMHG

## 2021-01-27 DIAGNOSIS — M24.661 ANKYLOSIS, RIGHT KNEE: ICD-10-CM

## 2021-01-27 DIAGNOSIS — Z98.890 OTHER SPECIFIED POSTPROCEDURAL STATES: Chronic | ICD-10-CM

## 2021-01-27 DIAGNOSIS — Z98.891 HISTORY OF UTERINE SCAR FROM PREVIOUS SURGERY: Chronic | ICD-10-CM

## 2021-01-27 DIAGNOSIS — Z90.49 ACQUIRED ABSENCE OF OTHER SPECIFIED PARTS OF DIGESTIVE TRACT: Chronic | ICD-10-CM

## 2021-01-27 PROCEDURE — 27570 FIXATION OF KNEE JOINT: CPT | Mod: 78,RT

## 2021-01-27 PROCEDURE — 73562 X-RAY EXAM OF KNEE 3: CPT

## 2021-01-27 PROCEDURE — 27570 FIXATION OF KNEE JOINT: CPT | Mod: RT

## 2021-01-27 PROCEDURE — 73562 X-RAY EXAM OF KNEE 3: CPT | Mod: 26,RT

## 2021-01-27 RX ORDER — OXYCODONE HYDROCHLORIDE 5 MG/1
10 TABLET ORAL
Refills: 0 | Status: DISCONTINUED | OUTPATIENT
Start: 2021-01-27 | End: 2021-01-27

## 2021-01-27 RX ORDER — ACETAMINOPHEN 500 MG
2 TABLET ORAL
Qty: 0 | Refills: 0 | DISCHARGE

## 2021-01-27 RX ORDER — SODIUM CHLORIDE 9 MG/ML
1000 INJECTION, SOLUTION INTRAVENOUS
Refills: 0 | Status: DISCONTINUED | OUTPATIENT
Start: 2021-01-27 | End: 2021-02-10

## 2021-01-27 RX ORDER — KETOROLAC TROMETHAMINE 30 MG/ML
15 SYRINGE (ML) INJECTION EVERY 6 HOURS
Refills: 0 | Status: COMPLETED | OUTPATIENT
Start: 2021-01-27 | End: 2021-01-28

## 2021-01-27 RX ORDER — ONDANSETRON 8 MG/1
4 TABLET, FILM COATED ORAL ONCE
Refills: 0 | Status: COMPLETED | OUTPATIENT
Start: 2021-01-27 | End: 2021-01-27

## 2021-01-27 RX ORDER — ASPIRIN/CALCIUM CARB/MAGNESIUM 324 MG
1 TABLET ORAL
Qty: 0 | Refills: 0 | DISCHARGE

## 2021-01-27 RX ORDER — OXYCODONE 5 MG/1
5 TABLET ORAL
Qty: 40 | Refills: 0 | Status: ACTIVE | COMMUNITY
Start: 2021-01-27 | End: 1900-01-01

## 2021-01-27 RX ORDER — LEVETIRACETAM 250 MG/1
3 TABLET, FILM COATED ORAL
Qty: 0 | Refills: 0 | DISCHARGE

## 2021-01-27 RX ORDER — OXYCODONE HYDROCHLORIDE 5 MG/1
1 TABLET ORAL
Qty: 0 | Refills: 0 | DISCHARGE
Start: 2021-01-27

## 2021-01-27 RX ORDER — OXYCODONE HYDROCHLORIDE 5 MG/1
5 TABLET ORAL
Refills: 0 | Status: DISCONTINUED | OUTPATIENT
Start: 2021-01-27 | End: 2021-01-27

## 2021-01-27 RX ORDER — ALENDRONATE SODIUM 70 MG/1
1 TABLET ORAL
Qty: 0 | Refills: 0 | DISCHARGE

## 2021-01-27 RX ORDER — HYDROMORPHONE HYDROCHLORIDE 2 MG/ML
0.25 INJECTION INTRAMUSCULAR; INTRAVENOUS; SUBCUTANEOUS
Refills: 0 | Status: DISCONTINUED | OUTPATIENT
Start: 2021-01-27 | End: 2021-01-27

## 2021-01-27 RX ORDER — OXYCODONE HYDROCHLORIDE 5 MG/1
5 TABLET ORAL ONCE
Refills: 0 | Status: DISCONTINUED | OUTPATIENT
Start: 2021-01-27 | End: 2021-01-27

## 2021-01-27 RX ADMIN — HYDROMORPHONE HYDROCHLORIDE 0.25 MILLIGRAM(S): 2 INJECTION INTRAMUSCULAR; INTRAVENOUS; SUBCUTANEOUS at 14:54

## 2021-01-27 RX ADMIN — HYDROMORPHONE HYDROCHLORIDE 0.25 MILLIGRAM(S): 2 INJECTION INTRAMUSCULAR; INTRAVENOUS; SUBCUTANEOUS at 15:05

## 2021-01-27 RX ADMIN — ONDANSETRON 4 MILLIGRAM(S): 8 TABLET, FILM COATED ORAL at 15:31

## 2021-01-27 RX ADMIN — OXYCODONE HYDROCHLORIDE 5 MILLIGRAM(S): 5 TABLET ORAL at 15:30

## 2021-01-27 NOTE — ASU PATIENT PROFILE, ADULT - PMH
Ankylosis, right knee    COVID-19  March 2020   home quarantine   mild symptoms  Globus pharyngeus  h/o  H/O placenta previa    OA (osteoarthritis)    OP (osteoporosis)    Seizure  last episode 2016 (missed dose)

## 2021-01-27 NOTE — ASU PATIENT PROFILE, ADULT - FALL HARM RISK CONCLUSION
Okay to try Aleve for your hand.     Bring back the urine sample.     Patient Education   Personalized Prevention Plan  You are due for the preventive services outlined below.  Your care team is available to assist you in scheduling these services.  If you have already completed any of these items, please share that information with your care team to update in your medical record.  Health Maintenance Due   Topic Date Due     Zoster (Shingles) Vaccine (1 of 2) 06/20/1994     Pneumococcal Vaccine (2 of 2 - PCV13) 07/07/2011     Eye Exam  04/24/2018     Diabetic Foot Exam  02/14/2019     FALL RISK ASSESSMENT  08/06/2019     Kidney Microalbumin Urine Test  08/10/2019     Annual Wellness Visit  08/06/2019       Exercise for a Healthier Heart     Exercise with a friend. When activity is fun, you're more likely to stick with it.   You may wonder how you can improve the health of your heart. If you re thinking about exercise, you re on the right track. You don t need to become an athlete, but you do need a certain amount of brisk exercise to help strengthen your heart. If you have been diagnosed with a heart condition, your doctor may recommend exercise to help stabilize your condition. To help make exercise a habit, choose safe, fun activities.  Be sure to check with your healthcare provider before starting an exercise program.   Why exercise?  Exercising regularly offers many healthy rewards. It can help you do all of the following:    Improve your blood cholesterol level to help prevent further heart trouble    Lower your blood pressure to help prevent a stroke or heart attack    Control diabetes, or reduce your risk of getting this disease    Improve your heart and lung function    Reach and maintain a healthy weight    Make your muscles stronger and more limber so you can stay active    Prevent falls and fractures by slowing the loss of bone mass (osteoporosis)    Manage stress better    Reduce your blood  pressure    Improve your sense of self and your body image  Exercise tips  Ease into your routine. Set small goals. Then build on them.  Exercise on most days. Aim for a total of 150 or more minutes of moderate to  vigorous intensity activity each week. Consider 40 minutes, 3 to 4 times a week. For best results, activity should last for 40 minutes on average. It is OK to work up to the 40 minute period over time. Examples of moderate-intensity activity is walking 1 mile in 15 minutes or 30 to 45 minutes of yard work.  Step up your daily activity level. Along with your exercise program, try being more active throughout the day. Walk instead of drive. Do more household tasks or yard work.  Choose one or more activities you enjoy. Walking is one of the easiest things you can do. You can also try swimming, riding a bike, dancing, or taking an exercise class.  Stop exercising and call your doctor if you:    Have chest pain or feel dizzy or lightheaded    Feel burning, tightness, pressure, or heaviness in your chest, neck, shoulders, back, or arms    Have unusual shortness of breath    Have increased joint or muscle pain    Have palpitations or an irregular heartbeat   Date Last Reviewed: 5/1/2016 2000-2018 [employer]. [address] [place] [address], [place] [address]. All rights reserved. This information is not intended as a substitute for professional medical care. Always follow your healthcare professional's instructions.          Signs of Hearing Loss     Hearing much better with one ear can be a sign of hearing loss.     Hearing loss is a problem shared by many people. In fact, it is one of the most common health conditions, particularly as people age. Most people over age 65 have some hearing loss, and by age 80, almost everyone does. Because hearing loss usually occurs slowly over the years, you may not realize your hearing ability has gotten worse.  Have your hearing checked  Contact your healthcare  provider if you:    Have to strain to hear normal conversation    Have to watch other people s faces very carefully to follow what they re saying    Need to ask people to repeat what they ve said    Often misunderstand what people are saying    Turn the volume of the television or radio up so high that others complain    Feel that people are mumbling when they re talking to you    Find that the effort to hear leaves you feeling tired and irritated    Notice, when using the phone, that you hear better with one ear than the other  Date Last Reviewed: 12/1/2016 2000-2018 [employer]. [address] [place] [id number]. All rights reserved. This information is not intended as a substitute for professional medical care. Always follow your healthcare professional's instructions.            Fall Risk

## 2021-01-27 NOTE — ASU PATIENT PROFILE, ADULT - PSH
H/O arthroscopy of knee  Right    H/O arthroscopy of shoulder  2010 Right  H/O hand surgery  2017 Left hand  H/O:     S/P cholecystectomy  > 15 years ago  S/P right knee surgery  total knee 2020

## 2021-01-27 NOTE — ASU PATIENT PROFILE, ADULT - PT NEEDS ASSIST
Sheath #1: Dressed using transparent dressing. Site: clean, dry, & intact, no bleeding and no hematoma. yes

## 2021-01-28 ENCOUNTER — NON-APPOINTMENT (OUTPATIENT)
Age: 56
End: 2021-01-28

## 2021-02-03 ENCOUNTER — APPOINTMENT (OUTPATIENT)
Dept: ORTHOPEDIC SURGERY | Facility: CLINIC | Age: 56
End: 2021-02-03
Payer: MEDICAID

## 2021-02-03 VITALS — TEMPERATURE: 97.3 F

## 2021-02-03 PROCEDURE — 99024 POSTOP FOLLOW-UP VISIT: CPT

## 2021-02-03 NOTE — HISTORY OF PRESENT ILLNESS
[de-identified] : Status-post right total knee arthroplasty here for 6-week postoperative evaluation and 1 week status post right knee manipulation under anesthesia.  Her pain is better controlled.  Working with physical therapy aggressively.  Using the knee immobilizer at night and also with the CPM during the day she states per protocol.  Feels that she is making excellent improvement.  States that at home she can bend her knee to 120 degrees in the CPM device.  Ambulating with a cane but feels that she is much better since the manipulation under anesthesia.

## 2021-02-03 NOTE — PHYSICAL EXAM
[de-identified] : Patient is well nourished, well-developed, in no acute distress, with appropriate mood and affect. The patient is oriented to time, place, and person. Respirations are even and unlabored. Examination reveals satisfactory wound healing. No surrounding erythema. Motion is painless within the range that she is able to achieve. Active knee range of motion is .   Able to straight leg raise.

## 2021-02-03 NOTE — DISCUSSION/SUMMARY
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  Continue work aggressively with physical therapy.  Continue with pain medication.  Continue with the CPM for 3 more weeks.  Follow-up in 2 weeks for another range of motion check.  All questions answered.

## 2021-02-04 ENCOUNTER — NON-APPOINTMENT (OUTPATIENT)
Age: 56
End: 2021-02-04

## 2021-02-17 ENCOUNTER — APPOINTMENT (OUTPATIENT)
Dept: ORTHOPEDIC SURGERY | Facility: CLINIC | Age: 56
End: 2021-02-17
Payer: MEDICAID

## 2021-02-17 DIAGNOSIS — T84.82XA FIBROSIS DUE TO INTERNAL ORTHOPEDIC PROSTHETIC DEVICES, IMPLANTS AND GRAFTS, INITIAL ENCOUNTER: ICD-10-CM

## 2021-02-17 PROCEDURE — 99024 POSTOP FOLLOW-UP VISIT: CPT

## 2021-02-17 RX ORDER — OMEPRAZOLE 40 MG/1
40 CAPSULE, DELAYED RELEASE ORAL
Qty: 30 | Refills: 0 | Status: ACTIVE | COMMUNITY
Start: 2021-02-17 | End: 1900-01-01

## 2021-02-17 RX ORDER — OXYCODONE 5 MG/1
5 TABLET ORAL
Qty: 40 | Refills: 0 | Status: ACTIVE | COMMUNITY
Start: 2021-02-17 | End: 1900-01-01

## 2021-02-17 RX ORDER — NAPROXEN 500 MG/1
500 TABLET ORAL
Qty: 30 | Refills: 0 | Status: ACTIVE | COMMUNITY
Start: 2021-02-17 | End: 1900-01-01

## 2021-02-17 NOTE — PHYSICAL EXAM
[de-identified] : Patient is well nourished, well-developed, in no acute distress, with appropriate mood and affect. The patient is oriented to time, place, and person. Respirations are even and unlabored. Examination reveals satisfactory wound healing. No surrounding erythema. Motion is painless within the range that she is able to achieve. Active knee range of motion is 5-115.  Able to straight leg raise.

## 2021-02-17 NOTE — HISTORY OF PRESENT ILLNESS
[de-identified] : Status-post right total knee arthroplasty 12/23/20 and status post right knee manipulation under anesthesia 1/27/21.  Her pain is better controlled on naproxen and oxycodone.  She never took the MS Contin.  Working with physical therapy aggressively.  Using the knee immobilizer at night and also with the CPM during the day she states per protocol.  Feels that she is making excellent improvement.  States that at home she can bend her knee to 120 degrees in the CPM device.  Ambulating without now a cane and feels that she is much better since the manipulation under anesthesia.

## 2021-02-17 NOTE — DISCUSSION/SUMMARY
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  Continue work aggressively with physical therapy.  Continue with pain medication.  Continue with the CPM  as it has been the most beneficial in achieving her ROM.  Discontinue the knee immobilizer at night.  Renewed oxycodone, naproxen and pantoprazole.  Follow-up in 6 weeks for another range of motion check.  All questions answered.

## 2021-03-23 ENCOUNTER — FORM ENCOUNTER (OUTPATIENT)
Age: 56
End: 2021-03-23

## 2021-03-23 ENCOUNTER — APPOINTMENT (OUTPATIENT)
Dept: ORTHOPEDIC SURGERY | Facility: CLINIC | Age: 56
End: 2021-03-23

## 2021-03-30 ENCOUNTER — APPOINTMENT (OUTPATIENT)
Dept: ORTHOPEDIC SURGERY | Facility: CLINIC | Age: 56
End: 2021-03-30
Payer: MEDICAID

## 2021-03-30 PROCEDURE — 99024 POSTOP FOLLOW-UP VISIT: CPT

## 2021-03-30 NOTE — HISTORY OF PRESENT ILLNESS
[de-identified] : Status-post right total knee arthroplasty 12/23/20 and status post right knee manipulation under anesthesia 1/27/21.  Her pain is well controlled on Advil and Tylenol.  Working with physical therapy aggressively.  No longer using the knee immobilizer at night. She is finished with the CPM. She is very happy with her progress. She still gets pain at night but its slowly improving. She ambulates without a cane.

## 2021-03-30 NOTE — DISCUSSION/SUMMARY
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  Continue working on home exercises.   Follow-up at 6 month anniversary.  All questions answered.

## 2021-03-30 NOTE — PHYSICAL EXAM
[de-identified] : Patient is well nourished, well-developed, in no acute distress, with appropriate mood and affect. The patient is oriented to time, place, and person. Respirations are even and unlabored. Examination reveals satisfactory wound healing. No surrounding erythema. Motion is painless within the range that she is able to achieve. Active knee range of motion is 0-120.  Able to straight leg raise.

## 2021-05-22 NOTE — H&P PST ADULT - PRETERM DELIVERIES, OB PROFILE
Transferred to [phone] from [phone]. Assessment completed. Oriented to room and nurses call light. Denies any chest pain or shortness of breath. Heparin drip infusing. 0

## 2021-07-06 ENCOUNTER — APPOINTMENT (OUTPATIENT)
Dept: ORTHOPEDIC SURGERY | Facility: CLINIC | Age: 56
End: 2021-07-06
Payer: MEDICAID

## 2021-07-06 PROCEDURE — 73564 X-RAY EXAM KNEE 4 OR MORE: CPT | Mod: RT

## 2021-07-06 PROCEDURE — 99213 OFFICE O/P EST LOW 20 MIN: CPT

## 2021-07-06 NOTE — HISTORY OF PRESENT ILLNESS
[de-identified] : Status-post right total knee arthroplasty 12/23/20 and status post right knee manipulation under anesthesia 1/27/21. Some stiffness and pain when she stands for a long period of time. Walking more than 1 hour at a time. Her pain is well controlled on Advil and Tylenol.  Doing HEP. She is very happy with her progress. She still gets pain at night intermittently but overall improving. Not requiring a cane.

## 2021-07-06 NOTE — DISCUSSION/SUMMARY
[de-identified] : Doing well s/p R TKA. Discussed that some of her symptoms are within normal limits after a TKA. She is significantly better than before surgery and pleased with outcome. Continue with HEP. Follow up in 1 year.

## 2021-07-06 NOTE — PHYSICAL EXAM
[de-identified] : Patient is well nourished, well-developed, in no acute distress, with appropriate mood and affect. The patient is oriented to time, place, and person. Respirations are even and unlabored. Examination reveals satisfactory wound healing. No surrounding erythema. Motion is painless within the range that she is able to achieve. Active knee range of motion is 0-125.  Able to straight leg raise. [de-identified] : AP, lateral, sunrise knee x-rays of the right knee were ordered and obtained in the office and demonstrate satisfactory position and alignment of the components are present. No signs of loosening are seen. \par

## 2021-07-15 ENCOUNTER — APPOINTMENT (OUTPATIENT)
Dept: ORTHOPEDIC SURGERY | Facility: CLINIC | Age: 56
End: 2021-07-15
Payer: MEDICAID

## 2021-07-15 VITALS — HEIGHT: 61 IN | BODY MASS INDEX: 27.56 KG/M2 | WEIGHT: 146 LBS

## 2021-07-15 DIAGNOSIS — M25.511 PAIN IN RIGHT SHOULDER: ICD-10-CM

## 2021-07-15 DIAGNOSIS — G89.29 PAIN IN RIGHT SHOULDER: ICD-10-CM

## 2021-07-15 PROCEDURE — 99214 OFFICE O/P EST MOD 30 MIN: CPT | Mod: 25

## 2021-07-15 PROCEDURE — 20610 DRAIN/INJ JOINT/BURSA W/O US: CPT | Mod: RT

## 2021-07-15 PROCEDURE — 73030 X-RAY EXAM OF SHOULDER: CPT | Mod: 26,RT

## 2021-07-15 NOTE — PHYSICAL EXAM
[de-identified] : General Exam\par \par Well developed, well nourished\par No apparent distress\par Oriented to person, place, and time\par Mood: Normal\par Affect: Normal\par Balance and coordination: Normal\par Gait: Normal\par \par Right shoulder exam\par \par Inspection: No swelling, ecchymosis or gross deformity.\par Skin: No masses, No lesions\par Tenderness: No bicipital tenderness, no tenderness to the greater tuberosity/RTC insertion, no anterior shoulder/lesser tuberosity tenderness. No tenderness SC joint, clavicle, AC joint.\par ROM: 90/40/unable to reach behind back due to pain\par Impingement tests: Positive Roth\par AC Joint: no pain with cross arm testing\par Biceps: Negative speed\par Strength: 4/5 abduction, external rotation, and internal rotation\par Neuro: AIN, PIN, Ulnar nerve motor intact\par Sensation: Intact to light touch in radial, median, ulnar, and axillary nerve distributions\par Vasc: 2+ radial pulse\par  [de-identified] : 3 views R shoulder obtained.  no acute fx/dislocation.  glenohumeral arthritis noted.

## 2021-07-15 NOTE — HISTORY OF PRESENT ILLNESS
[de-identified] : 56-year-old female presents complaining of right shoulder pain for several months. She states she has a history of a shoulder dislocation over 30 years ago.  She states something may have been torn at that time. She had a surgery more recently for her shoulder "to remove something" but unsure exactly what was done. She reports moderate severe pain. She states she is unable to lift her arm over her head due to the pain. She states her symptoms are worsening. She has tried home physical therapy exercises, anti-inflammatory medications over the last several months. Denies numbness tingling.\par \par The patient's past medical history, past surgical history, medications, allergies, and social history were reviewed by me today with the patient and documented accordingly. In addition, the patient's family history, which is noncontributory to this visit, was also reviewed.\par

## 2021-07-15 NOTE — DISCUSSION/SUMMARY
[de-identified] : I discussed the treatment of degenerative arthritis with the patient at length today. I described the spectrum of treatment from nonoperative modalities to total joint arthroplasty. Noninvasive and nonoperative treatment modalities include weight reduction, activity modification with low impact exercise, PRN use of acetaminophen or anti-inflammatory medication if tolerated, glucosamine/chondroitin supplements, and physical therapy. Further treatments can include corticosteroid injection and the use of hyaluronic acid injections. Definitive treatment can certainly include total joint arthroplasty also. The risks and benefits of each treatment options was discussed and all questions were answered.\par \par Injection: right shoulder (GH joint).\par Indication: OA\par \par A discussion was had with the patient regarding this procedure and all questions were answered. All risks, benefits and alternatives were discussed. These included but were not limited to bleeding, infection, and allergic reaction. Alcohol was used to clean the skin, and betadine was used to sterilize and prep the area in the posterior aspect of the right shoulder. Ethyl chloride spray was then used as a topical anesthetic. A 21-gauge needle was used to inject 4cc of 1% lidocaine and 1cc of 40mg/ml methylprednisolone into the right glenohumeral joint. A sterile bandage was then applied. The patient tolerated the procedure well and there were no complications.\par \par physical therapy.  f/u prn.

## 2021-07-30 ENCOUNTER — APPOINTMENT (OUTPATIENT)
Dept: ORTHOPEDIC SURGERY | Facility: CLINIC | Age: 56
End: 2021-07-30
Payer: MEDICAID

## 2021-07-30 VITALS
DIASTOLIC BLOOD PRESSURE: 87 MMHG | WEIGHT: 147 LBS | HEIGHT: 61 IN | SYSTOLIC BLOOD PRESSURE: 147 MMHG | BODY MASS INDEX: 27.75 KG/M2 | HEART RATE: 62 BPM

## 2021-07-30 PROCEDURE — 99214 OFFICE O/P EST MOD 30 MIN: CPT

## 2021-08-27 ENCOUNTER — APPOINTMENT (OUTPATIENT)
Dept: ORTHOPEDIC SURGERY | Facility: CLINIC | Age: 56
End: 2021-08-27
Payer: MEDICAID

## 2021-08-27 VITALS
BODY MASS INDEX: 27.75 KG/M2 | HEART RATE: 65 BPM | WEIGHT: 147 LBS | DIASTOLIC BLOOD PRESSURE: 87 MMHG | HEIGHT: 61 IN | SYSTOLIC BLOOD PRESSURE: 151 MMHG

## 2021-08-27 DIAGNOSIS — M19.011 PRIMARY OSTEOARTHRITIS, RIGHT SHOULDER: ICD-10-CM

## 2021-08-27 PROCEDURE — 99213 OFFICE O/P EST LOW 20 MIN: CPT

## 2021-08-27 RX ORDER — DICLOFENAC SODIUM 75 MG/1
75 TABLET, DELAYED RELEASE ORAL
Qty: 1 | Refills: 0 | Status: ACTIVE | COMMUNITY
Start: 2021-07-30 | End: 1900-01-01

## 2021-08-27 NOTE — DISCUSSION/SUMMARY
[de-identified] : The patient has had significant relief from diclofenac.  I have discussed the potential complications from prolonged use.  She will try to taper herself from the medication and work on range of motion exercises.  If she is unable to get beyond the medication she will be referred for physical therapy.  I have asked her to speak to her primary care physician regarding her osteoporosis.

## 2021-08-27 NOTE — PHYSICAL EXAM
[Rad] : radial 2+ and symmetric bilaterally [Normal] : Alert and in no acute distress [Poor Appearance] : well-appearing [Acute Distress] : not in acute distress [de-identified] : The patient has no respiratory distress. Mood and affect are normal. The patient is alert and oriented to person, place and time.\par Examination of the cervical spine demonstrates no tenderness, no deformity and no muscle spasm. Cervical spine rotation is 60° to the right, 60° to the left, 75° of extension and 45° of flexion. Neurologic exam of the upper extremities reveals intact sensation to light touch. Motor function is 5 over 5 in all groups. Deep tendon reflexes are 2+ and equal at the biceps, triceps and brachioradialis.\par Examination of the right shoulder demonstrates no deformity.  The shoulder is clinically located.  She has no pain with range of motion of her shoulder.  She has elevation of 145 degrees, external rotation of 60 degrees and internal rotation to the mid lumbar level.  She has slightly greater internal rotation on the left side.  The elbows are stable and nontender.  The skin is intact.  There is no lymphedema.

## 2021-08-27 NOTE — HISTORY OF PRESENT ILLNESS
[de-identified] : The patient returns for reevaluation of her right shoulder.  She has had significant relief from diclofenac.  She states that she now has only minimal pain with activities.  She also inquires about a recent bone density study.  She has a history of osteoporosis and is taking medication.

## 2021-10-29 ENCOUNTER — APPOINTMENT (OUTPATIENT)
Dept: ORTHOPEDIC SURGERY | Facility: CLINIC | Age: 56
End: 2021-10-29

## 2022-01-20 NOTE — ASU DISCHARGE PLAN (ADULT/PEDIATRIC) - PHYSICIAN SECTION COMPLETE
[facility]   Progress Note    Admitting Date and Time: 1/17/2022 10:52 AM  Admit Dx: Lactic acidosis [E87.2]  Acute urinary tract infection [N39.0]  Atrial fibrillation with RVR (HCC) [I48.91]  Acute respiratory failure with hypoxia (HCC) [J96.01]  COVID-19 [U07.1]    Subjective:    Patient seen and examined. Will answer me when I talk to her Needs fed for meals. Audible wheezing and congestion this am.       ROS: Denies chest pain, SOB, cough, congestion, subjective fever, chills, N/V/D, abdominal pain, HA.  All systems reviewed and negative unless otherwise neg       insulin glargine  25 Units SubCUTAneous Daily    ascorbic acid  500 mg Oral BID    dexamethasone  6 mg IntraVENous Q24H    cefepime  1,000 mg IntraVENous Q12H    apixaban  5 mg Oral BID    citalopram  10 mg Oral Daily    divalproex  125 mg Oral TID    docusate sodium  100 mg Oral BID    flecainide  100 mg Oral BID    megestrol acetate  200 mg Oral Daily    metoprolol succinate  12.5 mg Oral Daily    ocuvite-lutein  1 tablet Oral Daily    sodium chloride flush  5-40 mL IntraVENous 2 times per day    insulin lispro  0-6 Units SubCUTAneous TID WC    insulin lispro  0-3 Units SubCUTAneous Nightly    vitamin D  2,000 Units Oral Daily    vitamin B-6  50 mg Oral Daily    zinc sulfate  50 mg Oral Daily     sodium chloride flush, 5-40 mL, PRN  sodium chloride, 25 mL, PRN  ondansetron, 4 mg, Q8H PRN   Or  ondansetron, 4 mg, Q6H PRN  polyethylene glycol, 17 g, Daily PRN  acetaminophen, 650 mg, Q6H PRN   Or  acetaminophen, 650 mg, Q6H PRN  glucose, 15 g, PRN  dextrose, 12.5 g, PRN  glucagon (rDNA), 1 mg, PRN  dextrose, 100 mL/hr, PRN         Objective:    BP (!) 175/78   Pulse 116   Temp 98.5 °F (36.9 °C) (Axillary)   Resp 16   Ht 5' 4\" (1.626 m)   Wt 175 lb 11.2 oz (79.7 kg)   LMP  (LMP Unknown)   SpO2 95%   BMI 30.16 kg/m²      General Appearance: alert and oriented to person,  in no acute distress  Skin: warm and dry  Head: normocephalic and atraumatic  Eyes: pupils equal, round, and reactive to light, extraocular eye movements intact, conjunctivae normal  Neck: neck supple and non tender without mass   Pulmonary/Chest: clear to auscultation bilaterally- no wheezes, rales or rhonchi, normal air movement, no respiratory distress  Cardiovascular: normal rate, normal S1 and S2 and no carotid bruits  Abdomen: soft, non-tender, non-distended, normal bowel sounds, no masses or organomegaly  Extremities: no cyanosis, no clubbing and no edema  Neurologic: no cranial nerve deficit and speech normal  :  Segura draining yellow urine    Recent Labs     01/17/22  1105 01/17/22  1147 01/18/22  0945   NA  --  143 146   K  --  4.2 3.7   CL  --  108* 115*   CO2  --  18* 19*   BUN  --  31* 31*   CREATININE  --  1.1* 0.8   GLUCOSE 195 221* 297*   CALCIUM  --  9.0 8.4*       Recent Labs     01/17/22  1147 01/18/22  0945   ALKPHOS 116* 122*   PROT 7.5 6.3*   LABALBU 2.9* 2.4*   BILITOT 0.9 0.4   AST 25 20   ALT 8 10       Recent Labs     01/17/22  1147 01/18/22  0945   WBC 19.6* 20.0*   RBC 4.45 3.91   HGB 13.4 11.7   HCT 42.3 36.7   MCV 95.1 93.9   MCH 30.1 29.9   MCHC 31.7* 31.9*   RDW 14.1 14.2    237   MPV 13.0* 12.9*            Radiology:   US PELVIS LIMITED   Final Result   1. Very limited evaluation of the uterus and endometrium. No obvious   uterine mass. Endometrium appears mildly thickened for patient age. 2.  Nonvisualization of either ovary. There are no adnexal masses. XR CHEST PORTABLE   Final Result   1. Right perihilar and right lower lobe infiltrates         CT HEAD WO CONTRAST   Final Result   1. There is no acute intracranial abnormality. Specifically, there is no   intracranial hemorrhage. 2. Advanced atrophy and periventricular leukomalacia,   3. 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          CT ABDOMEN PELVIS W IV CONTRAST Additional Contrast? None   Final Result   Patchy infiltrates and ground-glass densities in the lung bases concerning   for pneumonia or edema. Constipation with redundant colon. Consider colonoscopy for better   assessment of the colon. No discrete mass is identified in the abdomen   pelvis. Fluid and the air in the endometrium of the uterus. Consider  gyn assessment   and ultrasonography. RECOMMENDATIONS:   Unavailable             Assessment:  Principal Problem:    Acute sepsis (HCC)  Active Problems:    Acute metabolic encephalopathy    Acute respiratory failure due to COVID-19 (HCC)    Type II diabetes mellitus (HCC)    Atrial flutter with rapid ventricular response (HCC)    Acute urinary tract infection    JOSE DE JESUS (acute kidney injury) ([facility] [address]    Hypertension    Hyperlipidemia    GERD (gastroesophageal reflux disease)  Resolved Problems:    * No resolved hospital problems. *      Plan:    1. Acute sepsis: COVID/PNA. Fever (102.4)-resolved, tachycardia (141)- improving, hypoxia (88%)- resolved with O2. Lactic acid-improved 2.1. . Leukocytosis (20.0). Urine and blood cultures pending. Continue cefepime  (day 3) until cultures resulted. 2. Acute metabilic encephalopathy: multifactorial due to sepsis, Covid infection, UTI. Improving. 3. Acute hypoxic respiratory failure: secondary to COVID. Now on room air. Given dose Tocilizumab. Continue decadron, nebs, vitamins. Procal 0.36. Worsening congestion and wheezing this am. Repeat  CXR this am with resolving RLL infiltrate. Will  decrease IV fluids to 75ml/hr. 4. Acute UTI: urine culture with GNR. Continue cefepime until final results. 5. JOSE DE JESUS: resolved. Cr 0.8. 6. afib RVR:  Improving. Continue Flecanide, apixaban, metoprolol. TSH 0.300  7. DM2: Hyperglycemia improved since increasing home Lantus to 25 units daily. Continue low dose insulin slide scale, hypoglycemia protocol. Recent A1c 6. 5. Monitor blood sugars, adjust insulin as needed to keep BS <180.    8. HTN: elevated.  Continue home metoprolol , add lisinopril 5mg. 9. Malnutrition: seen by dietician. Supplements ordered per same. Will initiate TF if NGT placed due to poor po intake.             Electronically signed by SHARI Culver CNP on 1/20/2022 at 7:04 AM Yes

## 2022-03-24 ENCOUNTER — APPOINTMENT (OUTPATIENT)
Dept: ORTHOPEDIC SURGERY | Facility: CLINIC | Age: 57
End: 2022-03-24
Payer: MEDICAID

## 2022-03-24 VITALS — WEIGHT: 147 LBS | BODY MASS INDEX: 27.75 KG/M2 | HEIGHT: 61 IN

## 2022-03-24 PROCEDURE — 73564 X-RAY EXAM KNEE 4 OR MORE: CPT | Mod: RT

## 2022-03-24 PROCEDURE — 99214 OFFICE O/P EST MOD 30 MIN: CPT

## 2022-03-24 NOTE — DISCUSSION/SUMMARY
[de-identified] : The patient is doing well after right total knee arthroplasty. Continue to be weight bearing as tolerated without restriction. Follow up is recommended at the 1 year anniversary from surgery.  She has naproxen at home so she will try to take some of this medication.  Physical therapy prescribed.

## 2022-03-24 NOTE — PHYSICAL EXAM
[de-identified] : Patient is well nourished, well-developed, in no acute distress, with appropriate mood and affect. The patient is oriented to time, place, and person. Respirations are even and unlabored. Gait evaluation does not reveal a limp. There is no inguinal adenopathy. Examination of the contralateral knee shows normal range of motion, strength, no tenderness, and intact skin. The operative limb is well-perfused, has a well appearing surgical scar, and shows a grossly normal motor and sensory examination. Knee motion is painless and the right knee moves from 0 to 125 degrees. The knee is stable within that range-of-motion to AP and ML stress. The alignment of the knee is neutral. Muscle strength is normal. Quadriceps and hamstring muscle strength is normal bilaterally. Pedal pulses are palpable.  [de-identified] : AP, lateral, sunrise knee x-rays of the right knee were ordered and obtained in the office and demonstrate satisfactory position and alignment of the components are present. No signs of loosening are seen.

## 2022-03-24 NOTE — HISTORY OF PRESENT ILLNESS
[de-identified] : This is very nice 57-year-old female here for interim evaluation of right total knee arthroplasty. The patient reports good pain relief since surgery in the replaced joint and satisfactory restoration of function in terms of activities of daily living. The patient no longer requires an assistive device for ambulation, does not require pain medication, and completed postoperative physical therapy. They report unlimited activities of daily living and unlimited walking tolerance. The patient is thrilled with their progress from surgery and ultimate outcome. \par She did have a fall approximately 1 month ago which flared some of her pain.  Still has some mild stiffness but is doing all of her activities.

## 2022-04-19 NOTE — ASU DISCHARGE PLAN (ADULT/PEDIATRIC) - CARE PROVIDER_API CALL
I reviewed with the resident the medical history and the resident's findings on the physical examination. I discussed with the resident the patient's diagnosis and concur with the plan. Kade Merritt)  Orthopedics  [address], [address]  [place] [address]  Phone: [phone]  Fax: [phone]  Follow Up Time:

## 2022-06-09 ENCOUNTER — APPOINTMENT (OUTPATIENT)
Dept: ORTHOPEDIC SURGERY | Facility: CLINIC | Age: 57
End: 2022-06-09
Payer: MEDICAID

## 2022-06-09 VITALS
BODY MASS INDEX: 27.75 KG/M2 | DIASTOLIC BLOOD PRESSURE: 87 MMHG | SYSTOLIC BLOOD PRESSURE: 151 MMHG | WEIGHT: 147 LBS | HEIGHT: 61 IN

## 2022-06-09 DIAGNOSIS — M18.11 UNILATERAL PRIMARY OSTEOARTHRITIS OF FIRST CARPOMETACARPAL JOINT, RIGHT HAND: ICD-10-CM

## 2022-06-09 PROCEDURE — 99204 OFFICE O/P NEW MOD 45 MIN: CPT

## 2022-06-09 PROCEDURE — 73130 X-RAY EXAM OF HAND: CPT | Mod: RT

## 2022-06-09 RX ORDER — MELOXICAM 15 MG/1
15 TABLET ORAL
Qty: 30 | Refills: 11 | Status: ACTIVE | COMMUNITY
Start: 2022-06-09 | End: 1900-01-01

## 2022-10-27 ENCOUNTER — APPOINTMENT (OUTPATIENT)
Dept: ORTHOPEDIC SURGERY | Facility: CLINIC | Age: 57
End: 2022-10-27

## 2022-10-27 VITALS
OXYGEN SATURATION: 99 % | WEIGHT: 146 LBS | SYSTOLIC BLOOD PRESSURE: 154 MMHG | BODY MASS INDEX: 27.56 KG/M2 | DIASTOLIC BLOOD PRESSURE: 83 MMHG | TEMPERATURE: 97.2 F | HEIGHT: 61 IN | HEART RATE: 67 BPM

## 2022-10-27 DIAGNOSIS — M25.561 PAIN IN RIGHT KNEE: ICD-10-CM

## 2022-10-27 DIAGNOSIS — G89.29 PAIN IN RIGHT KNEE: ICD-10-CM

## 2022-10-27 DIAGNOSIS — S80.01XA CONTUSION OF RIGHT KNEE, INITIAL ENCOUNTER: ICD-10-CM

## 2022-10-27 PROCEDURE — 73502 X-RAY EXAM HIP UNI 2-3 VIEWS: CPT | Mod: RT

## 2022-10-27 PROCEDURE — 73564 X-RAY EXAM KNEE 4 OR MORE: CPT | Mod: RT

## 2022-10-27 PROCEDURE — 99214 OFFICE O/P EST MOD 30 MIN: CPT

## 2022-10-27 RX ORDER — NAPROXEN 500 MG/1
500 TABLET ORAL
Qty: 60 | Refills: 2 | Status: ACTIVE | COMMUNITY
Start: 2022-10-27 | End: 1900-01-01

## 2022-10-27 NOTE — HISTORY OF PRESENT ILLNESS
[de-identified] : This is very nice 57-year-old female here for interim evaluation of right total knee arthroplasty.  She initially did very well from the surgery and then fell back in March.  This flared the pain.  Then the pain got better.  Yesterday a child in her school flipped over his chair and hit her knee.  She has severe pain since then.  She did not fall in the chair hit her knee.  She is ambulating without a cane or walker.  Only taking Tylenol for pain.

## 2022-10-27 NOTE — PHYSICAL EXAM
[de-identified] : Patient is well nourished, well-developed, in no acute distress, with appropriate mood and affect. The patient is oriented to time, place, and person. Respirations are even and unlabored. Gait evaluation does not reveal a limp. There is no inguinal adenopathy. Examination of the contralateral knee shows normal range of motion, strength, no tenderness, and intact skin. The operative limb is well-perfused, has a well appearing surgical scar, and shows a grossly normal motor and sensory examination. Knee motion is painless but restricted from 0 to 80 degrees. The knee is stable within that range-of-motion to AP and ML stress. The alignment of the knee is neutral. Muscle strength is normal. Quadriceps and hamstring muscle strength is normal bilaterally. Pedal pulses are palpable.  Tender to palpation in the lateral joint line.\par Examination of the right hip shows no skin lesions. Hip motion is full and painless from 0-90 degrees extension to flexion, 20 degrees adduction and 20 degrees abduction, and 15 degrees internal and 30 degrees external rotation. Leg lengths are approximately equal. FADIR is negative and FABIAN is negative. Stinchfield test is negative. Both hips are stable and muscle strength is normal with good strength with resisted abduction and adduction. Pedal pulses are palpable. [de-identified] : AP, lateral, sunrise knee x-rays of the right knee were ordered and obtained in the office and demonstrate satisfactory position and alignment of the components are present. No signs of loosening are seen.\par \par AP and lateral x-rays of the right hip, pelvis, and femur were ordered and taken in the office and demonstrate no evidence of degenerative joint disease of the hip with maintained joint space and no evidence of fractures or other intraarticular pathology.

## 2022-10-27 NOTE — H&P PST ADULT - NSANTHNECKRD_ENT_A_CORE
You can access the FollowMyHealth Patient Portal offered by [facility] by registering at the following website: [email or web address]. By joining [employer]’s FollowMyHealth portal, you will also be able to view your health information using other applications (apps) compatible with our system. No

## 2022-10-27 NOTE — DISCUSSION/SUMMARY
[de-identified] : The patient is doing well after right total knee arthroplasty.  She now has a contusion after a impact injury yesterday.  Her hip is normal.  Continue to be weight bearing as tolerated without restriction. Daily home exercise program recommended.  Prescribed naproxen.  She should continue with Tylenol.  Follow-up is recommended in 6 weeks to recheck her range of motion to make sure that it comes back.

## 2022-12-08 ENCOUNTER — APPOINTMENT (OUTPATIENT)
Dept: ORTHOPEDIC SURGERY | Facility: CLINIC | Age: 57
End: 2022-12-08

## 2022-12-08 VITALS
HEART RATE: 67 BPM | WEIGHT: 146 LBS | OXYGEN SATURATION: 99 % | SYSTOLIC BLOOD PRESSURE: 154 MMHG | BODY MASS INDEX: 27.56 KG/M2 | HEIGHT: 61 IN | DIASTOLIC BLOOD PRESSURE: 83 MMHG | TEMPERATURE: 97 F

## 2022-12-08 PROCEDURE — 99213 OFFICE O/P EST LOW 20 MIN: CPT

## 2022-12-08 NOTE — PHYSICAL EXAM
[de-identified] : Patient is well nourished, well-developed, in no acute distress, with appropriate mood and affect. The patient is oriented to time, place, and person. Respirations are even and unlabored. Gait evaluation does not reveal a limp. There is no inguinal adenopathy. Examination of the contralateral knee shows normal range of motion, strength, no tenderness, and intact skin. The operative limb is well-perfused, has a well appearing surgical scar, and shows a grossly normal motor and sensory examination. Knee motion is painless and moves from 0-125 degrees. The knee is stable within that range-of-motion to AP and ML stress. The alignment of the knee is neutral. Muscle strength is normal. Quadriceps and hamstring muscle strength is normal bilaterally. Pedal pulses are palpable. Not TTP.

## 2022-12-08 NOTE — DISCUSSION/SUMMARY
[de-identified] : The patient is doing well after right total knee arthroplasty and is recovering from a knee contusion.  Continue to be weight bearing as tolerated without restriction. Daily home exercise program recommended. Continue with mobic. She should continue with Tylenol. Follow up in 3 months.

## 2022-12-08 NOTE — REASON FOR VISIT
[Follow-Up Visit] : a follow-up visit for [Artificial Knee Joint] : an artificial knee joint [Knee Pain] : knee pain

## 2022-12-08 NOTE — HISTORY OF PRESENT ILLNESS
[de-identified] : This is very nice 57-year-old female here for interim evaluation of right total knee arthroplasty.  She initially did very well from the surgery and then fell back in March.  This flared the pain.  Then the pain got better.  6 weeks ago a child in her school flipped over his chair and hit her knee.  She has severe pain since then although overall it is improving.  She is ambulating without a cane or walker.  Only taking Tylenol for pain.  Meloxicam helps somewhat.

## 2023-03-24 ENCOUNTER — APPOINTMENT (OUTPATIENT)
Dept: ORTHOPEDIC SURGERY | Facility: CLINIC | Age: 58
End: 2023-03-24
Payer: MEDICAID

## 2023-03-24 VITALS — WEIGHT: 146 LBS | BODY MASS INDEX: 27.56 KG/M2 | HEIGHT: 61 IN

## 2023-03-24 PROCEDURE — 99213 OFFICE O/P EST LOW 20 MIN: CPT

## 2023-03-24 PROCEDURE — 73564 X-RAY EXAM KNEE 4 OR MORE: CPT | Mod: RT

## 2023-03-24 NOTE — PHYSICAL EXAM
[de-identified] : Patient is well nourished, well-developed, in no acute distress, with appropriate mood and affect. The patient is oriented to time, place, and person. Respirations are even and unlabored. Gait evaluation does not reveal a limp. There is no inguinal adenopathy. Examination of the contralateral knee shows normal range of motion, strength, no tenderness, and intact skin. The operative limb is well-perfused, has a well appearing surgical scar, and shows a grossly normal motor and sensory examination. Knee motion is painless and the right knee moves from 0 to 125 degrees. The knee is stable within that range-of-motion to AP and ML stress. The alignment of the knee is neutral. Muscle strength is normal. Quadriceps and hamstring muscle strength is normal bilaterally. Pedal pulses are palpable.  [de-identified] : AP, lateral, tunnel, and sunrise knee x-rays of the right knee were ordered and obtained in the office and demonstrate satisfactory position and alignment of the components are present. No signs of loosening are seen.

## 2023-03-24 NOTE — HISTORY OF PRESENT ILLNESS
[de-identified] : This is very nice 58-year-old female here for interim evaluation of right knee. The patient reports good pain relief since surgery in the replaced joint and satisfactory restoration of function in terms of activities of daily living. The patient no longer requires an assistive device for ambulation, does not require pain medication, and completed postoperative physical therapy. They report unlimited activities of daily living and unlimited walking tolerance. The patient is thrilled with their progress from surgery and ultimate outcome. \par

## 2023-03-24 NOTE — DISCUSSION/SUMMARY
[de-identified] : The patient is doing well after right total knee arthroplasty. Continue to be weight bearing as tolerated without restriction. Daily home exercise program recommended. Follow up is recommended in 1 year.

## 2023-09-15 ASSESSMENT — KOOS JR
STANDING UPRIGHT: MILD
TWISING OR PIVOTING ON KNEE: MODERATE
IMPORTED KOOS JR SCORE: 52.46
BENDING TO THE FLOOR TO PICK UP OBJECT: EXTREME
IMPORTED LATERALITY: RIGHT
IMPORTED KOOS JR SCORE: 42.28
KOOS JR RAW SCORE: 15
IMPORTED KOOS JR SCORE: 50.01
TWISING OR PIVOTING ON KNEE: MILD
GOING UP OR DOWN STAIRS: MILD
IMPORTED KOOS JR SCORE: 47.49
STANDING UPRIGHT: MODERATE
HOW SEVERE IS YOUR KNEE STIFFNESS AFTER FIRST WAKING IN MORNING: SEVERE
STRAIGHTENING KNEE FULLY: MODERATE
KOOS JR RAW SCORE: 16
KOOS JR RAW SCORE: 14
TWISING OR PIVOTING ON KNEE: SEVERE
STRAIGHTENING KNEE FULLY: MILD
GOING UP OR DOWN STAIRS: MODERATE
HOW SEVERE IS YOUR KNEE STIFFNESS AFTER FIRST WAKING IN MORNING: EXTREME
IMPORTED FORM: YES
RISING FROM SITTING: MODERATE
RISING FROM SITTING: EXTREME
GOING UP OR DOWN STAIRS: SEVERE
KOOS JR RAW SCORE: 18

## 2024-03-17 ENCOUNTER — NON-APPOINTMENT (OUTPATIENT)
Age: 59
End: 2024-03-17

## 2024-03-17 ENCOUNTER — EMERGENCY (EMERGENCY)
Facility: HOSPITAL | Age: 59
LOS: 1 days | Discharge: HOME | End: 2024-03-17
Attending: EMERGENCY MEDICINE
Payer: MEDICAID

## 2024-03-17 VITALS
SYSTOLIC BLOOD PRESSURE: 158 MMHG | DIASTOLIC BLOOD PRESSURE: 95 MMHG | OXYGEN SATURATION: 98 % | TEMPERATURE: 99 F | RESPIRATION RATE: 15 BRPM | HEIGHT: 61 IN | HEART RATE: 63 BPM | WEIGHT: 147.05 LBS

## 2024-03-17 VITALS
RESPIRATION RATE: 17 BRPM | DIASTOLIC BLOOD PRESSURE: 82 MMHG | SYSTOLIC BLOOD PRESSURE: 122 MMHG | OXYGEN SATURATION: 97 % | TEMPERATURE: 98 F | HEART RATE: 70 BPM

## 2024-03-17 DIAGNOSIS — Z98.890 OTHER SPECIFIED POSTPROCEDURAL STATES: Chronic | ICD-10-CM

## 2024-03-17 DIAGNOSIS — Z98.891 HISTORY OF UTERINE SCAR FROM PREVIOUS SURGERY: Chronic | ICD-10-CM

## 2024-03-17 DIAGNOSIS — Z90.49 ACQUIRED ABSENCE OF OTHER SPECIFIED PARTS OF DIGESTIVE TRACT: Chronic | ICD-10-CM

## 2024-03-17 PROCEDURE — 73090 X-RAY EXAM OF FOREARM: CPT

## 2024-03-17 PROCEDURE — 73590 X-RAY EXAM OF LOWER LEG: CPT | Mod: 26,LT,RT

## 2024-03-17 PROCEDURE — 73130 X-RAY EXAM OF HAND: CPT

## 2024-03-17 PROCEDURE — 73564 X-RAY EXAM KNEE 4 OR MORE: CPT | Mod: 26,LT,RT

## 2024-03-17 PROCEDURE — 73564 X-RAY EXAM KNEE 4 OR MORE: CPT

## 2024-03-17 PROCEDURE — 73090 X-RAY EXAM OF FOREARM: CPT | Mod: 26,LT

## 2024-03-17 PROCEDURE — 73110 X-RAY EXAM OF WRIST: CPT | Mod: 26,RT

## 2024-03-17 PROCEDURE — 99284 EMERGENCY DEPT VISIT MOD MDM: CPT

## 2024-03-17 PROCEDURE — 73130 X-RAY EXAM OF HAND: CPT | Mod: 26,RT

## 2024-03-17 PROCEDURE — 73590 X-RAY EXAM OF LOWER LEG: CPT

## 2024-03-17 PROCEDURE — 99284 EMERGENCY DEPT VISIT MOD MDM: CPT | Mod: 25

## 2024-03-17 PROCEDURE — 73110 X-RAY EXAM OF WRIST: CPT

## 2024-03-17 RX ORDER — IBUPROFEN 200 MG
600 TABLET ORAL ONCE
Refills: 0 | Status: COMPLETED | OUTPATIENT
Start: 2024-03-17 | End: 2024-03-17

## 2024-03-17 RX ORDER — ACETAMINOPHEN 500 MG
975 TABLET ORAL ONCE
Refills: 0 | Status: COMPLETED | OUTPATIENT
Start: 2024-03-17 | End: 2024-03-17

## 2024-03-17 RX ADMIN — Medication 975 MILLIGRAM(S): at 14:55

## 2024-03-17 RX ADMIN — Medication 600 MILLIGRAM(S): at 14:55

## 2024-03-17 NOTE — ED ADULT NURSE NOTE - NSFALLRISKINTERV_ED_ALL_ED

## 2024-03-17 NOTE — ED PROVIDER NOTE - OBJECTIVE STATEMENT
60yo female pt with PMHx of Seizure on Keppra (last episode 2016 (missed dose), s/p right knee replacement (in 2021 by Dr. Merritt) presents to ED with right hand pain and b/l knee pain s/p mechanical fall last night. Reports she tripped and fell on tile floor last night and has been able to ambulate with limping. Denies LOC, seizure, or head injury. Denies headache, dizziness, visual changes or N/V. Denies neck or back pain. Denies sensory changes or weakness to extremities. Denies CP/SOB/ABD pain or hip pain. Denies fever, chills or recent sickness.

## 2024-03-17 NOTE — ED ADULT NURSE NOTE - NURSING MUSC KNEE SYMPTOMS LEFT
I would like to see you back in the office with repeat blood test in 6-8 weeks  Remember to avoid NSAIDs (no ibuprofen, Motrin, Advil, Aleve, naproxen)  Okay to take Tylenol or paracetamol or acetaminophen as needed for pain  If you develop any episode nausea, vomiting, shortness of Breath, decreased urination, a fluid retention, or any acute changes, recommend to go to the emergency department for urgent evaluation  Continue with regular follow-up with you vascular surgeon  I want you to go to [facility] [employer] class for education  pain/redness

## 2024-03-17 NOTE — ED PROVIDER NOTE - CARE PROVIDER_API CALL
Kade Merritt  Orthopaedic Surgery  [address], [address]  [place] [address]  Phone: [phone]  Fax: [phone]  Follow Up Time:

## 2024-03-17 NOTE — ED ADULT NURSE NOTE - OBJECTIVE STATEMENT
58yo F w/ PMH osteoporosis, presents to ED s/p mechanical trip and fall. Pt states she was walking and had a mechanical trip and fall bracing her fall with her right wrist and falling on to her left knee. Pt endorsing pain to her right wrist and left knee. Denies any chest pain, LOC, blood thinner use, headstrike, numbness/tingling to extremities. A&Ox3. Strong peripheral pulses. Neuralgically intact and following commands. Pulse motor and sensation presents and equal to all 4 extremities. Bruising noted to right wrist, swelling noted to left knee. Pt ambulatory w/ 1 assist w/ steady gait, some pain on ambulation to left knee. Stretcher locked and in lowest position, appropriate side rails up. Pt instructed to notify RN if assistance is needed.

## 2024-03-17 NOTE — ED PROVIDER NOTE - PROGRESS NOTE DETAILS
Attending note (Faraz): endorsed to me at signout, pending x-rays: no evidence of fractures on x-rays, stable for dc.

## 2024-03-17 NOTE — ED ADULT NURSE NOTE - PRO INTERPRETER NEED 2
I do not mind ordering a stress test, having said that we should get a formal request from the GI office performing the colonoscopy. English

## 2024-03-17 NOTE — ED PROVIDER NOTE - CARE PROVIDERS DIRECT ADDRESSES
[email or web address]

## 2024-03-17 NOTE — ED PROVIDER NOTE - NSFOLLOWUPINSTRUCTIONS_ED_ALL_ED_FT
You were evaluated in the Emergency Department for injuries after a fall.  You were evaluated and examined by a physician, and you had x-rays.      Based on your evaluation: there was no evidence of fractures on your xrays.    There are no signs of emergency conditions requiring admission to the hospital on today's workup.  Based on the evaluation, a presumptive diagnosis was made, however, further evaluation may be required by your primary care physician or a specialist for a more definitive diagnosis.  Therefore, please follow-up as directed or return to the Emergency Department if your symptoms change or worsen.    We recommend that you:  1. See your primary care physician within the next 72 hours for follow up.  Bring a copy of your discharge paperwork (including any test results) to your doctor.  2. Follow-up with an orthopedic surgery within the next 1-2 weeks. Use ace wrap for the left knee and wrist splint for the right wrist, as instructed.  3. Take Tylenol (acetaminophen) up to two extra-strength tablets (500mg each) every 6 hours as needed for pain.      *** Return immediately if you have worsening pain, or any other new/concerning symptoms. ***

## 2024-03-17 NOTE — ED PROVIDER NOTE - PHYSICAL EXAMINATION
NAD. VSS. Afebrile. Neck supple. Lungs clear. No spinal tender. No chest wall, rib, or cva tender. ABD soft, non tender. No hip tender. +Right hand; the volar aspect of carpal/radius tender, swelling, ecchymosis, and full ROMs. N/V- intact. B/L knee (L>R) ant tender with left swelling and diminished ROMs. +Proximal tibia/fib tender bilaterally. Neuro- intact.

## 2024-03-17 NOTE — ED PROVIDER NOTE - PATIENT PORTAL LINK FT
You can access the FollowMyHealth Patient Portal offered by [facility] by registering at the following website: [email or web address]. By joining [employer]’s FollowMyHealth portal, you will also be able to view your health information using other applications (apps) compatible with our system.

## 2024-03-17 NOTE — ED PROVIDER NOTE - ATTENDING APP SHARED VISIT CONTRIBUTION OF CARE
Hx: mechanical fall, FOOSH R hand, landed on b/l knees. c/o R wrist pain, LEFT knee pain.  Had RIGHT TKR 3yrs ago Dr. Merritt.  LEFT knee is swollen, tender.  RIGHT wrist is tender, bruised. No neck/back/chest/ab/head pain, no head strike.      PE:  Upper extremities: RIGHT wrist ecchymosis volar, mild soft tissue td, no proximal forearm/elbow/humerus td or pain.  radial and ulnar pulses intact.    Lower extremities: LEFT knee with suprapatellar swelling, tenderness soft tissue, no appreciable joint effusion, unable to flex knee due to discomfort, no joint line td.  RIGHT knee with scar c/w TKR, nontender, joint intact.  well appearing, nontoxic, no respiratory distress.  Neuro nonfocal.  Skin intact. Psych normal mood.  Nexus criteria met and negative.  No cervical, thoracic, or lumbar spine tenderness.  No motor weakness of hand or arm, no sensory deficit.     MDM: blunt injury to right wrist, left knee, r/o fracture. xrays of extremities, pain meds

## 2024-03-28 ENCOUNTER — APPOINTMENT (OUTPATIENT)
Dept: ORTHOPEDIC SURGERY | Facility: CLINIC | Age: 59
End: 2024-03-28
Payer: MEDICAID

## 2024-03-28 VITALS — WEIGHT: 146 LBS | BODY MASS INDEX: 27.56 KG/M2 | HEIGHT: 61 IN

## 2024-03-28 DIAGNOSIS — G89.29 PAIN IN RIGHT KNEE: ICD-10-CM

## 2024-03-28 DIAGNOSIS — Z96.651 PRESENCE OF RIGHT ARTIFICIAL KNEE JOINT: ICD-10-CM

## 2024-03-28 DIAGNOSIS — S80.02XA CONTUSION OF LEFT KNEE, INITIAL ENCOUNTER: ICD-10-CM

## 2024-03-28 DIAGNOSIS — S80.01XA CONTUSION OF RIGHT KNEE, INITIAL ENCOUNTER: ICD-10-CM

## 2024-03-28 DIAGNOSIS — M17.12 UNILATERAL PRIMARY OSTEOARTHRITIS, LEFT KNEE: ICD-10-CM

## 2024-03-28 DIAGNOSIS — M25.561 PAIN IN RIGHT KNEE: ICD-10-CM

## 2024-03-28 DIAGNOSIS — M25.562 PAIN IN RIGHT KNEE: ICD-10-CM

## 2024-03-28 PROCEDURE — 99214 OFFICE O/P EST MOD 30 MIN: CPT

## 2024-03-28 RX ORDER — CELECOXIB 100 MG/1
100 CAPSULE ORAL TWICE DAILY
Qty: 60 | Refills: 2 | Status: ACTIVE | COMMUNITY
Start: 2024-03-28 | End: 1900-01-01

## 2024-03-28 NOTE — DISCUSSION/SUMMARY
[de-identified] : The patient has bilateral knee pain status post fall, right total knee arthroplasty and left knee osteoarthritis. She has contusions. An extensive discussion was conducted on the natural history of the disease and the variety of surgical and non-surgical options available to the patient including, but not limited to non-steroidal anti-inflammatory medications, steroid injections, physical therapy, maintenance of ideal body weight, and reduction of activity. Celebrex and PT prescribed. The patient will schedule an appointment as needed.

## 2024-03-28 NOTE — PHYSICAL EXAM
[de-identified] : Patient is well nourished, well-developed, in no acute distress, with appropriate mood and affect. The patient is oriented to time, place, and person. Respirations are even and unlabored. Gait evaluation does reveal a limp. There is no inguinal adenopathy. Bilateral limbs are well-perfused, without skin lesions, shows a grossly normal motor and sensory examination. The right knee motion is painless and the right knee moves from 0-115 degrees. The knee is stable within that range-of-motion to AP and ML stress. The alignment of the knee is neutral. Muscle strength is normal. Pedal pulses are palpable. Hip examination was negative. The left knee motion is mildly painful. The left knee moves from 0-135 degrees. The knee is stable within that range-of-motion to AP and ML stress. The alignment of the knee is 5 degrees varus. Muscle strength is normal. Pedal pulses are palpable. Hip examination was negative.  Able to straight leg raise bilateral. [de-identified] : AP, lateral, tunnel, and sunrise knee x-rays of the right knee were brought in by the patient from the emergency department which I reviewed and demonstrate satisfactory position and alignment of the components are present. No signs of loosening are seen.  Long standing knee, AP knee, lateral knee, and patellar views of the left knee were brought in by the patient from the emergency department which I reviewed and demonstrate no evidence of degenerative joint disease of the knee, fractures, intra-articular pathology.

## 2024-03-28 NOTE — HISTORY OF PRESENT ILLNESS
[de-identified] : This is a very nice  59 year old  female experiencing  pain in the both knees after a fall on Saturday.  She had a right total knee arthroplasty in December 2020  Her pain is mild-moderate in intensity.  She was seen at the emergency room and [facility] and x-rays were taken and were negative. The pain somewhat limits activities of daily living. Walking tolerance is slightly reduced. The patient denies any radiation of the pain to the feet and it is not associated with numbness, tingling, or weakness.

## 2024-04-16 ENCOUNTER — APPOINTMENT (OUTPATIENT)
Dept: ORTHOPEDIC SURGERY | Facility: CLINIC | Age: 59
End: 2024-04-16

## 2024-09-20 ENCOUNTER — APPOINTMENT (OUTPATIENT)
Dept: ORTHOPEDIC SURGERY | Facility: CLINIC | Age: 59
End: 2024-09-20

## 2024-10-07 ENCOUNTER — EMERGENCY (EMERGENCY)
Facility: HOSPITAL | Age: 59
LOS: 1 days | End: 2024-10-07
Payer: MEDICAID

## 2024-10-07 VITALS
WEIGHT: 143.08 LBS | HEART RATE: 75 BPM | RESPIRATION RATE: 18 BRPM | OXYGEN SATURATION: 98 % | TEMPERATURE: 98 F | SYSTOLIC BLOOD PRESSURE: 157 MMHG | HEIGHT: 61 IN | DIASTOLIC BLOOD PRESSURE: 85 MMHG

## 2024-10-07 DIAGNOSIS — Z98.891 HISTORY OF UTERINE SCAR FROM PREVIOUS SURGERY: Chronic | ICD-10-CM

## 2024-10-07 DIAGNOSIS — Z98.890 OTHER SPECIFIED POSTPROCEDURAL STATES: Chronic | ICD-10-CM

## 2024-10-07 DIAGNOSIS — Z90.49 ACQUIRED ABSENCE OF OTHER SPECIFIED PARTS OF DIGESTIVE TRACT: Chronic | ICD-10-CM

## 2024-10-07 PROCEDURE — L9991: CPT
